# Patient Record
Sex: MALE | Race: WHITE | NOT HISPANIC OR LATINO | Employment: UNEMPLOYED | URBAN - NONMETROPOLITAN AREA
[De-identification: names, ages, dates, MRNs, and addresses within clinical notes are randomized per-mention and may not be internally consistent; named-entity substitution may affect disease eponyms.]

---

## 2019-03-07 ENCOUNTER — APPOINTMENT (OUTPATIENT)
Dept: CT IMAGING | Facility: HOSPITAL | Age: 49
End: 2019-03-07

## 2019-03-07 ENCOUNTER — HOSPITAL ENCOUNTER (EMERGENCY)
Facility: HOSPITAL | Age: 49
Discharge: HOME | End: 2019-03-07
Attending: EMERGENCY MEDICINE | Admitting: EMERGENCY MEDICINE

## 2019-03-07 VITALS
DIASTOLIC BLOOD PRESSURE: 69 MMHG | TEMPERATURE: 98.5 F | BODY MASS INDEX: 33.21 KG/M2 | HEIGHT: 70 IN | OXYGEN SATURATION: 98 % | RESPIRATION RATE: 18 BRPM | SYSTOLIC BLOOD PRESSURE: 151 MMHG | HEART RATE: 64 BPM | WEIGHT: 232 LBS

## 2019-03-07 DIAGNOSIS — M54.30 BACK PAIN WITH SCIATICA: Primary | ICD-10-CM

## 2019-03-07 DIAGNOSIS — M54.9 BACK PAIN WITH SCIATICA: Primary | ICD-10-CM

## 2019-03-07 PROCEDURE — 99283 EMERGENCY DEPT VISIT LOW MDM: CPT

## 2019-03-07 PROCEDURE — 72128 CT CHEST SPINE W/O DYE: CPT

## 2019-03-07 PROCEDURE — 25010000002 METHYLPREDNISOLONE PER 125 MG: Performed by: PHYSICIAN ASSISTANT

## 2019-03-07 PROCEDURE — 72131 CT LUMBAR SPINE W/O DYE: CPT

## 2019-03-07 PROCEDURE — 96372 THER/PROPH/DIAG INJ SC/IM: CPT

## 2019-03-07 PROCEDURE — 72128 CT CHEST SPINE W/O DYE: CPT | Performed by: RADIOLOGY

## 2019-03-07 PROCEDURE — 72131 CT LUMBAR SPINE W/O DYE: CPT | Performed by: RADIOLOGY

## 2019-03-07 PROCEDURE — 25010000002 KETOROLAC TROMETHAMINE PER 15 MG: Performed by: PHYSICIAN ASSISTANT

## 2019-03-07 RX ORDER — ORPHENADRINE CITRATE 100 MG/1
100 TABLET, EXTENDED RELEASE ORAL 2 TIMES DAILY
Qty: 20 TABLET | Refills: 0 | OUTPATIENT
Start: 2019-03-07 | End: 2022-04-29

## 2019-03-07 RX ORDER — METHYLPREDNISOLONE SODIUM SUCCINATE 125 MG/2ML
125 INJECTION, POWDER, LYOPHILIZED, FOR SOLUTION INTRAMUSCULAR; INTRAVENOUS ONCE
Status: COMPLETED | OUTPATIENT
Start: 2019-03-07 | End: 2019-03-07

## 2019-03-07 RX ORDER — KETOROLAC TROMETHAMINE 10 MG/1
10 TABLET, FILM COATED ORAL EVERY 6 HOURS PRN
Qty: 15 TABLET | Refills: 0 | OUTPATIENT
Start: 2019-03-07 | End: 2022-04-29

## 2019-03-07 RX ORDER — PREDNISONE 20 MG/1
20 TABLET ORAL DAILY
Qty: 5 TABLET | Refills: 0 | OUTPATIENT
Start: 2019-03-07 | End: 2022-04-29

## 2019-03-07 RX ORDER — KETOROLAC TROMETHAMINE 30 MG/ML
60 INJECTION, SOLUTION INTRAMUSCULAR; INTRAVENOUS ONCE
Status: COMPLETED | OUTPATIENT
Start: 2019-03-07 | End: 2019-03-07

## 2019-03-07 RX ADMIN — METHYLPREDNISOLONE SODIUM SUCCINATE 125 MG: 125 INJECTION, POWDER, FOR SOLUTION INTRAMUSCULAR; INTRAVENOUS at 12:15

## 2019-03-07 RX ADMIN — KETOROLAC TROMETHAMINE 60 MG: 60 INJECTION, SOLUTION INTRAMUSCULAR at 12:15
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
## 2019-12-06 ENCOUNTER — HOSPITAL ENCOUNTER (EMERGENCY)
Facility: HOSPITAL | Age: 49
Discharge: HOME | End: 2019-12-06
Attending: EMERGENCY MEDICINE | Admitting: EMERGENCY MEDICINE

## 2019-12-06 VITALS
SYSTOLIC BLOOD PRESSURE: 154 MMHG | WEIGHT: 222 LBS | TEMPERATURE: 98.2 F | HEIGHT: 70 IN | RESPIRATION RATE: 18 BRPM | DIASTOLIC BLOOD PRESSURE: 78 MMHG | OXYGEN SATURATION: 99 % | HEART RATE: 67 BPM | BODY MASS INDEX: 31.78 KG/M2

## 2019-12-06 DIAGNOSIS — M54.2 NECK PAIN: Primary | ICD-10-CM

## 2019-12-06 PROCEDURE — 25010000002 PROMETHAZINE PER 50 MG: Performed by: NURSE PRACTITIONER

## 2019-12-06 PROCEDURE — 96372 THER/PROPH/DIAG INJ SC/IM: CPT

## 2019-12-06 PROCEDURE — 99283 EMERGENCY DEPT VISIT LOW MDM: CPT

## 2019-12-06 PROCEDURE — 25010000002 METHYLPREDNISOLONE PER 80 MG: Performed by: NURSE PRACTITIONER

## 2019-12-06 PROCEDURE — 25010000002 HYDROMORPHONE 1 MG/ML SOLUTION: Performed by: NURSE PRACTITIONER

## 2019-12-06 RX ORDER — OMEPRAZOLE 20 MG/1
20 CAPSULE, DELAYED RELEASE ORAL DAILY
COMMUNITY

## 2019-12-06 RX ORDER — HYDROCODONE BITARTRATE AND ACETAMINOPHEN 10; 325 MG/1; MG/1
1 TABLET ORAL 2 TIMES DAILY
COMMUNITY

## 2019-12-06 RX ORDER — METOPROLOL TARTRATE 50 MG/1
25 TABLET, FILM COATED ORAL 2 TIMES DAILY
COMMUNITY
End: 2023-02-27

## 2019-12-06 RX ORDER — PROMETHAZINE HYDROCHLORIDE 25 MG/ML
12.5 INJECTION, SOLUTION INTRAMUSCULAR; INTRAVENOUS ONCE
Status: COMPLETED | OUTPATIENT
Start: 2019-12-06 | End: 2019-12-06

## 2019-12-06 RX ORDER — GABAPENTIN 800 MG/1
800 TABLET ORAL 2 TIMES DAILY
COMMUNITY

## 2019-12-06 RX ORDER — METHYLPREDNISOLONE ACETATE 80 MG/ML
120 INJECTION, SUSPENSION INTRA-ARTICULAR; INTRALESIONAL; INTRAMUSCULAR; SOFT TISSUE ONCE
Status: COMPLETED | OUTPATIENT
Start: 2019-12-06 | End: 2019-12-06

## 2019-12-06 RX ORDER — ALPRAZOLAM 1 MG/1
1 TABLET ORAL 2 TIMES DAILY PRN
COMMUNITY
End: 2022-01-10 | Stop reason: SDUPTHER

## 2019-12-06 RX ADMIN — METHYLPREDNISOLONE ACETATE 120 MG: 80 INJECTION, SUSPENSION INTRA-ARTICULAR; INTRALESIONAL; INTRAMUSCULAR; SOFT TISSUE at 12:39

## 2019-12-06 RX ADMIN — PROMETHAZINE HYDROCHLORIDE 12.5 MG: 25 INJECTION INTRAMUSCULAR; INTRAVENOUS at 12:38

## 2019-12-06 RX ADMIN — HYDROMORPHONE HYDROCHLORIDE 1 MG: 1 INJECTION, SOLUTION INTRAMUSCULAR; INTRAVENOUS; SUBCUTANEOUS at 12:37
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
## 2021-04-21 ENCOUNTER — HOSPITAL ENCOUNTER (OUTPATIENT)
Dept: GENERAL RADIOLOGY | Facility: HOSPITAL | Age: 51
Discharge: HOME | End: 2021-04-21

## 2021-04-21 ENCOUNTER — TRANSCRIBE ORDERS (OUTPATIENT)
Dept: ADMINISTRATIVE | Facility: HOSPITAL | Age: 51
End: 2021-04-21

## 2021-04-21 DIAGNOSIS — M50.03 CERVICAL DISC DISORDER WITH MYELOPATHY OF CERVICOTHORACIC REGION: ICD-10-CM

## 2021-04-21 DIAGNOSIS — R53.1 WEAKNESS: ICD-10-CM

## 2021-04-21 DIAGNOSIS — M54.50 LOW BACK PAIN, UNSPECIFIED BACK PAIN LATERALITY, UNSPECIFIED CHRONICITY, UNSPECIFIED WHETHER SCIATICA PRESENT: ICD-10-CM

## 2021-04-21 DIAGNOSIS — M50.03 CERVICAL DISC DISORDER WITH MYELOPATHY OF CERVICOTHORACIC REGION: Primary | ICD-10-CM

## 2021-04-21 PROCEDURE — 72050 X-RAY EXAM NECK SPINE 4/5VWS: CPT | Performed by: RADIOLOGY

## 2021-04-21 PROCEDURE — 72110 X-RAY EXAM L-2 SPINE 4/>VWS: CPT | Performed by: RADIOLOGY

## 2021-04-21 PROCEDURE — 72110 X-RAY EXAM L-2 SPINE 4/>VWS: CPT

## 2021-04-21 PROCEDURE — 72050 X-RAY EXAM NECK SPINE 4/5VWS: CPT

## 2021-12-08 ENCOUNTER — OFFICE VISIT (OUTPATIENT)
Dept: PSYCHIATRY | Facility: CLINIC | Age: 51
End: 2021-12-08

## 2021-12-08 VITALS
WEIGHT: 265 LBS | SYSTOLIC BLOOD PRESSURE: 126 MMHG | BODY MASS INDEX: 37.94 KG/M2 | HEART RATE: 68 BPM | HEIGHT: 70 IN | OXYGEN SATURATION: 96 % | DIASTOLIC BLOOD PRESSURE: 86 MMHG

## 2021-12-08 DIAGNOSIS — Z79.899 MEDICATION MANAGEMENT: ICD-10-CM

## 2021-12-08 DIAGNOSIS — F41.1 GENERALIZED ANXIETY DISORDER: ICD-10-CM

## 2021-12-08 DIAGNOSIS — F33.1 MODERATE EPISODE OF RECURRENT MAJOR DEPRESSIVE DISORDER (HCC): ICD-10-CM

## 2021-12-08 DIAGNOSIS — F31.32 BIPOLAR AFFECTIVE DISORDER, CURRENTLY DEPRESSED, MODERATE (HCC): Primary | ICD-10-CM

## 2021-12-08 DIAGNOSIS — G47.00 INSOMNIA, UNSPECIFIED TYPE: ICD-10-CM

## 2021-12-08 LAB
AMPHETAMINE CUT-OFF: NORMAL
BENZODIAZIPINE CUT-OFF: NORMAL
BUPRENORPHINE CUT-OFF: NORMAL
COCAINE CUT-OFF: NORMAL
EXTERNAL AMPHETAMINE SCREEN URINE: NEGATIVE
EXTERNAL BENZODIAZEPINE SCREEN URINE: NEGATIVE
EXTERNAL BUPRENORPHINE SCREEN URINE: NEGATIVE
EXTERNAL COCAINE SCREEN URINE: NEGATIVE
EXTERNAL MDMA: NEGATIVE
EXTERNAL METHADONE SCREEN URINE: NEGATIVE
EXTERNAL METHAMPHETAMINE SCREEN URINE: NEGATIVE
EXTERNAL OPIATES SCREEN URINE: NEGATIVE
EXTERNAL OXYCODONE SCREEN URINE: NEGATIVE
EXTERNAL THC SCREEN URINE: NEGATIVE
MDMA CUT-OFF: NORMAL
METHADONE CUT-OFF: NORMAL
METHAMPHETAMINE CUT-OFF: NORMAL
OPIATES CUT-OFF: NORMAL
OXYCODONE CUT-OFF: NORMAL
THC CUT-OFF: NORMAL

## 2021-12-08 PROCEDURE — 90792 PSYCH DIAG EVAL W/MED SRVCS: CPT | Performed by: NURSE PRACTITIONER

## 2021-12-08 RX ORDER — ESCITALOPRAM OXALATE 20 MG/1
20 TABLET ORAL DAILY
Qty: 30 TABLET | Refills: 0 | Status: SHIPPED | OUTPATIENT
Start: 2021-12-08 | End: 2022-01-10 | Stop reason: SDUPTHER

## 2021-12-08 RX ORDER — LOSARTAN POTASSIUM AND HYDROCHLOROTHIAZIDE 12.5; 5 MG/1; MG/1
TABLET ORAL
COMMUNITY
Start: 2021-11-17

## 2021-12-08 RX ORDER — HYDROXYZINE PAMOATE 25 MG/1
CAPSULE ORAL
Qty: 90 CAPSULE | Refills: 0 | Status: SHIPPED | OUTPATIENT
Start: 2021-12-08 | End: 2022-01-10 | Stop reason: SDUPTHER

## 2021-12-08 RX ORDER — ERGOCALCIFEROL 1.25 MG/1
CAPSULE ORAL
COMMUNITY
Start: 2021-10-07

## 2021-12-08 RX ORDER — GEMFIBROZIL 600 MG/1
TABLET, FILM COATED ORAL
COMMUNITY
Start: 2021-12-02

## 2021-12-08 RX ORDER — HYDROXYZINE PAMOATE 25 MG/1
CAPSULE ORAL
COMMUNITY
Start: 2021-11-17 | End: 2021-12-08 | Stop reason: SDUPTHER

## 2021-12-08 RX ORDER — TIZANIDINE 4 MG/1
TABLET ORAL
COMMUNITY
Start: 2021-12-02 | End: 2022-04-29

## 2021-12-08 RX ORDER — FLECAINIDE ACETATE 50 MG/1
TABLET ORAL
COMMUNITY
Start: 2021-12-02

## 2021-12-08 RX ORDER — IBUPROFEN 800 MG/1
TABLET ORAL
COMMUNITY
Start: 2021-12-02 | End: 2022-04-29

## 2021-12-08 RX ORDER — LAMOTRIGINE 25 MG/1
25 TABLET ORAL 2 TIMES DAILY
Qty: 60 TABLET | Refills: 0 | Status: SHIPPED | OUTPATIENT
Start: 2021-12-08 | End: 2022-01-10 | Stop reason: SDUPTHER

## 2022-01-10 ENCOUNTER — LAB (OUTPATIENT)
Dept: LAB | Facility: HOSPITAL | Age: 52
End: 2022-01-10

## 2022-01-10 ENCOUNTER — OFFICE VISIT (OUTPATIENT)
Dept: PSYCHIATRY | Facility: CLINIC | Age: 52
End: 2022-01-10

## 2022-01-10 VITALS
WEIGHT: 285 LBS | HEIGHT: 70 IN | DIASTOLIC BLOOD PRESSURE: 71 MMHG | SYSTOLIC BLOOD PRESSURE: 118 MMHG | BODY MASS INDEX: 40.8 KG/M2 | HEART RATE: 51 BPM

## 2022-01-10 DIAGNOSIS — F33.1 MODERATE EPISODE OF RECURRENT MAJOR DEPRESSIVE DISORDER: ICD-10-CM

## 2022-01-10 DIAGNOSIS — G47.00 INSOMNIA, UNSPECIFIED TYPE: ICD-10-CM

## 2022-01-10 DIAGNOSIS — F31.32 BIPOLAR AFFECTIVE DISORDER, CURRENTLY DEPRESSED, MODERATE: Primary | ICD-10-CM

## 2022-01-10 DIAGNOSIS — F41.1 GENERALIZED ANXIETY DISORDER: ICD-10-CM

## 2022-01-10 DIAGNOSIS — Z79.899 MEDICATION MANAGEMENT: ICD-10-CM

## 2022-01-10 DIAGNOSIS — Z79.899 MEDICATION MANAGEMENT: Primary | ICD-10-CM

## 2022-01-10 LAB
ALBUMIN SERPL-MCNC: 4.25 G/DL (ref 3.5–5.2)
ALBUMIN/GLOB SERPL: 1.9 G/DL
ALP SERPL-CCNC: 74 U/L (ref 39–117)
ALT SERPL W P-5'-P-CCNC: 8 U/L (ref 1–41)
AMPHETAMINE CUT-OFF: ABNORMAL
ANION GAP SERPL CALCULATED.3IONS-SCNC: 9.1 MMOL/L (ref 5–15)
AST SERPL-CCNC: 14 U/L (ref 1–40)
BASOPHILS # BLD AUTO: 0.03 10*3/MM3 (ref 0–0.2)
BASOPHILS NFR BLD AUTO: 0.8 % (ref 0–1.5)
BENZODIAZIPINE CUT-OFF: ABNORMAL
BILIRUB SERPL-MCNC: 0.3 MG/DL (ref 0–1.2)
BUN SERPL-MCNC: 26 MG/DL (ref 6–20)
BUN/CREAT SERPL: 20.8 (ref 7–25)
BUPRENORPHINE CUT-OFF: ABNORMAL
CALCIUM SPEC-SCNC: 10.1 MG/DL (ref 8.6–10.5)
CHLORIDE SERPL-SCNC: 99 MMOL/L (ref 98–107)
CHOLEST SERPL-MCNC: 181 MG/DL (ref 0–200)
CO2 SERPL-SCNC: 30.9 MMOL/L (ref 22–29)
COCAINE CUT-OFF: ABNORMAL
CREAT SERPL-MCNC: 1.25 MG/DL (ref 0.76–1.27)
DEPRECATED RDW RBC AUTO: 36.2 FL (ref 37–54)
EOSINOPHIL # BLD AUTO: 0.12 10*3/MM3 (ref 0–0.4)
EOSINOPHIL NFR BLD AUTO: 3.2 % (ref 0.3–6.2)
ERYTHROCYTE [DISTWIDTH] IN BLOOD BY AUTOMATED COUNT: 12 % (ref 12.3–15.4)
EXTERNAL AMPHETAMINE SCREEN URINE: NEGATIVE
EXTERNAL BENZODIAZEPINE SCREEN URINE: POSITIVE
EXTERNAL BUPRENORPHINE SCREEN URINE: NEGATIVE
EXTERNAL COCAINE SCREEN URINE: NEGATIVE
EXTERNAL MDMA: NEGATIVE
EXTERNAL METHADONE SCREEN URINE: NEGATIVE
EXTERNAL METHAMPHETAMINE SCREEN URINE: NEGATIVE
EXTERNAL OPIATES SCREEN URINE: POSITIVE
EXTERNAL OXYCODONE SCREEN URINE: NEGATIVE
EXTERNAL THC SCREEN URINE: POSITIVE
GFR SERPL CREATININE-BSD FRML MDRD: 61 ML/MIN/1.73
GLOBULIN UR ELPH-MCNC: 2.3 GM/DL
GLUCOSE SERPL-MCNC: 139 MG/DL (ref 65–99)
HBA1C MFR BLD: 6.1 % (ref 4.8–5.6)
HCT VFR BLD AUTO: 37.1 % (ref 37.5–51)
HDLC SERPL-MCNC: 26 MG/DL (ref 40–60)
HGB BLD-MCNC: 12.3 G/DL (ref 13–17.7)
IMM GRANULOCYTES # BLD AUTO: 0.01 10*3/MM3 (ref 0–0.05)
IMM GRANULOCYTES NFR BLD AUTO: 0.3 % (ref 0–0.5)
LDLC SERPL CALC-MCNC: 60 MG/DL (ref 0–100)
LDLC/HDLC SERPL: 1.11 {RATIO}
LYMPHOCYTES # BLD AUTO: 1.58 10*3/MM3 (ref 0.7–3.1)
LYMPHOCYTES NFR BLD AUTO: 42.7 % (ref 19.6–45.3)
MCH RBC QN AUTO: 27.6 PG (ref 26.6–33)
MCHC RBC AUTO-ENTMCNC: 33.2 G/DL (ref 31.5–35.7)
MCV RBC AUTO: 83.4 FL (ref 79–97)
MDMA CUT-OFF: ABNORMAL
METHADONE CUT-OFF: ABNORMAL
METHAMPHETAMINE CUT-OFF: ABNORMAL
MONOCYTES # BLD AUTO: 0.33 10*3/MM3 (ref 0.1–0.9)
MONOCYTES NFR BLD AUTO: 8.9 % (ref 5–12)
NEUTROPHILS NFR BLD AUTO: 1.63 10*3/MM3 (ref 1.7–7)
NEUTROPHILS NFR BLD AUTO: 44.1 % (ref 42.7–76)
NRBC BLD AUTO-RTO: 0 /100 WBC (ref 0–0.2)
OPIATES CUT-OFF: ABNORMAL
OXYCODONE CUT-OFF: ABNORMAL
PLATELET # BLD AUTO: 176 10*3/MM3 (ref 140–450)
PMV BLD AUTO: 10.2 FL (ref 6–12)
POTASSIUM SERPL-SCNC: 4.1 MMOL/L (ref 3.5–5.2)
PROT SERPL-MCNC: 6.5 G/DL (ref 6–8.5)
RBC # BLD AUTO: 4.45 10*6/MM3 (ref 4.14–5.8)
SODIUM SERPL-SCNC: 139 MMOL/L (ref 136–145)
T4 FREE SERPL-MCNC: 0.71 NG/DL (ref 0.93–1.7)
THC CUT-OFF: ABNORMAL
TRIGL SERPL-MCNC: 631 MG/DL (ref 0–150)
TSH SERPL DL<=0.05 MIU/L-ACNC: 5.7 UIU/ML (ref 0.27–4.2)
VLDLC SERPL-MCNC: 95 MG/DL (ref 5–40)
WBC NRBC COR # BLD: 3.7 10*3/MM3 (ref 3.4–10.8)

## 2022-01-10 PROCEDURE — 85025 COMPLETE CBC W/AUTO DIFF WBC: CPT | Performed by: NURSE PRACTITIONER

## 2022-01-10 PROCEDURE — 84443 ASSAY THYROID STIM HORMONE: CPT | Performed by: NURSE PRACTITIONER

## 2022-01-10 PROCEDURE — 99214 OFFICE O/P EST MOD 30 MIN: CPT | Performed by: NURSE PRACTITIONER

## 2022-01-10 PROCEDURE — 84439 ASSAY OF FREE THYROXINE: CPT | Performed by: NURSE PRACTITIONER

## 2022-01-10 PROCEDURE — 80061 LIPID PANEL: CPT | Performed by: NURSE PRACTITIONER

## 2022-01-10 PROCEDURE — 83036 HEMOGLOBIN GLYCOSYLATED A1C: CPT | Performed by: NURSE PRACTITIONER

## 2022-01-10 PROCEDURE — 80053 COMPREHEN METABOLIC PANEL: CPT | Performed by: NURSE PRACTITIONER

## 2022-01-10 RX ORDER — HYDROXYZINE PAMOATE 25 MG/1
CAPSULE ORAL
Qty: 90 CAPSULE | Refills: 0 | Status: SHIPPED | OUTPATIENT
Start: 2022-01-10 | End: 2022-02-07 | Stop reason: SDUPTHER

## 2022-01-10 RX ORDER — ALPRAZOLAM 0.5 MG/1
0.5 TABLET ORAL 3 TIMES DAILY PRN
Qty: 90 TABLET | Refills: 0 | Status: SHIPPED | OUTPATIENT
Start: 2022-01-10 | End: 2022-02-07 | Stop reason: SDUPTHER

## 2022-01-10 RX ORDER — LAMOTRIGINE 25 MG/1
50 TABLET ORAL 2 TIMES DAILY
Qty: 120 TABLET | Refills: 0 | Status: SHIPPED | OUTPATIENT
Start: 2022-01-10 | End: 2022-02-07 | Stop reason: SDUPTHER

## 2022-01-10 RX ORDER — ESCITALOPRAM OXALATE 20 MG/1
20 TABLET ORAL DAILY
Qty: 30 TABLET | Refills: 0 | Status: SHIPPED | OUTPATIENT
Start: 2022-01-10 | End: 2022-02-07 | Stop reason: SDUPTHER

## 2022-02-07 ENCOUNTER — OFFICE VISIT (OUTPATIENT)
Dept: PSYCHIATRY | Facility: CLINIC | Age: 52
End: 2022-02-07

## 2022-02-07 VITALS
BODY MASS INDEX: 39.22 KG/M2 | WEIGHT: 274 LBS | HEIGHT: 70 IN | DIASTOLIC BLOOD PRESSURE: 84 MMHG | SYSTOLIC BLOOD PRESSURE: 128 MMHG

## 2022-02-07 DIAGNOSIS — F41.1 GENERALIZED ANXIETY DISORDER: ICD-10-CM

## 2022-02-07 DIAGNOSIS — F33.1 MODERATE EPISODE OF RECURRENT MAJOR DEPRESSIVE DISORDER: ICD-10-CM

## 2022-02-07 DIAGNOSIS — F31.32 BIPOLAR AFFECTIVE DISORDER, CURRENTLY DEPRESSED, MODERATE: Primary | ICD-10-CM

## 2022-02-07 DIAGNOSIS — G47.00 INSOMNIA, UNSPECIFIED TYPE: ICD-10-CM

## 2022-02-07 DIAGNOSIS — Z79.899 MEDICATION MANAGEMENT: ICD-10-CM

## 2022-02-07 PROCEDURE — 99214 OFFICE O/P EST MOD 30 MIN: CPT | Performed by: NURSE PRACTITIONER

## 2022-02-07 RX ORDER — AMLODIPINE BESYLATE 10 MG/1
TABLET ORAL
COMMUNITY
Start: 2022-01-25

## 2022-02-07 RX ORDER — LAMOTRIGINE 25 MG/1
50 TABLET ORAL 2 TIMES DAILY
Qty: 120 TABLET | Refills: 0 | Status: SHIPPED | OUTPATIENT
Start: 2022-02-07 | End: 2022-03-07 | Stop reason: SDUPTHER

## 2022-02-07 RX ORDER — ESCITALOPRAM OXALATE 20 MG/1
20 TABLET ORAL DAILY
Qty: 30 TABLET | Refills: 0 | Status: SHIPPED | OUTPATIENT
Start: 2022-02-07 | End: 2022-03-07 | Stop reason: SDUPTHER

## 2022-02-07 RX ORDER — HYDROXYZINE PAMOATE 25 MG/1
CAPSULE ORAL
Qty: 90 CAPSULE | Refills: 0 | Status: SHIPPED | OUTPATIENT
Start: 2022-02-07 | End: 2022-03-07 | Stop reason: SDUPTHER

## 2022-02-07 RX ORDER — ALPRAZOLAM 0.5 MG/1
0.5 TABLET ORAL 3 TIMES DAILY PRN
Qty: 90 TABLET | Refills: 0 | Status: SHIPPED | OUTPATIENT
Start: 2022-02-07 | End: 2022-03-07 | Stop reason: SDUPTHER

## 2022-02-25 ENCOUNTER — TRANSCRIBE ORDERS (OUTPATIENT)
Dept: ADMINISTRATIVE | Facility: HOSPITAL | Age: 52
End: 2022-02-25

## 2022-02-25 DIAGNOSIS — Z13.6 SCREENING FOR ISCHEMIC HEART DISEASE: Primary | ICD-10-CM

## 2022-03-07 ENCOUNTER — OFFICE VISIT (OUTPATIENT)
Dept: PSYCHIATRY | Facility: CLINIC | Age: 52
End: 2022-03-07

## 2022-03-07 VITALS
HEART RATE: 72 BPM | BODY MASS INDEX: 39.22 KG/M2 | DIASTOLIC BLOOD PRESSURE: 84 MMHG | HEIGHT: 70 IN | WEIGHT: 274 LBS | SYSTOLIC BLOOD PRESSURE: 128 MMHG

## 2022-03-07 DIAGNOSIS — Z79.899 MEDICATION MANAGEMENT: ICD-10-CM

## 2022-03-07 DIAGNOSIS — G47.00 INSOMNIA, UNSPECIFIED TYPE: ICD-10-CM

## 2022-03-07 DIAGNOSIS — F41.1 GENERALIZED ANXIETY DISORDER: ICD-10-CM

## 2022-03-07 DIAGNOSIS — F33.1 MODERATE EPISODE OF RECURRENT MAJOR DEPRESSIVE DISORDER: ICD-10-CM

## 2022-03-07 DIAGNOSIS — F31.32 BIPOLAR AFFECTIVE DISORDER, CURRENTLY DEPRESSED, MODERATE: Primary | ICD-10-CM

## 2022-03-07 PROCEDURE — 99214 OFFICE O/P EST MOD 30 MIN: CPT | Performed by: NURSE PRACTITIONER

## 2022-03-07 RX ORDER — ESCITALOPRAM OXALATE 20 MG/1
20 TABLET ORAL DAILY
Qty: 30 TABLET | Refills: 2 | Status: SHIPPED | OUTPATIENT
Start: 2022-03-07 | End: 2022-06-08 | Stop reason: SDUPTHER

## 2022-03-07 RX ORDER — ALPRAZOLAM 0.5 MG/1
0.5 TABLET ORAL 3 TIMES DAILY PRN
Qty: 90 TABLET | Refills: 0 | Status: SHIPPED | OUTPATIENT
Start: 2022-03-07 | End: 2022-04-05 | Stop reason: SDUPTHER

## 2022-03-07 RX ORDER — LAMOTRIGINE 25 MG/1
50 TABLET ORAL 2 TIMES DAILY
Qty: 120 TABLET | Refills: 2 | Status: SHIPPED | OUTPATIENT
Start: 2022-03-07 | End: 2022-06-08 | Stop reason: SDUPTHER

## 2022-03-07 RX ORDER — HYDROXYZINE PAMOATE 25 MG/1
CAPSULE ORAL
Qty: 90 CAPSULE | Refills: 2 | Status: SHIPPED | OUTPATIENT
Start: 2022-03-07 | End: 2022-06-08 | Stop reason: SDUPTHER

## 2022-03-08 ENCOUNTER — APPOINTMENT (OUTPATIENT)
Dept: BONE DENSITY | Facility: HOSPITAL | Age: 52
End: 2022-03-08

## 2022-03-08 ENCOUNTER — APPOINTMENT (OUTPATIENT)
Dept: ULTRASOUND IMAGING | Facility: HOSPITAL | Age: 52
End: 2022-03-08

## 2022-03-14 ENCOUNTER — OFFICE VISIT (OUTPATIENT)
Dept: PSYCHIATRY | Facility: CLINIC | Age: 52
End: 2022-03-14

## 2022-03-14 DIAGNOSIS — F41.1 GENERALIZED ANXIETY DISORDER: ICD-10-CM

## 2022-03-14 DIAGNOSIS — F31.32 BIPOLAR AFFECTIVE DISORDER, CURRENTLY DEPRESSED, MODERATE: Primary | ICD-10-CM

## 2022-03-14 DIAGNOSIS — F33.1 MODERATE EPISODE OF RECURRENT MAJOR DEPRESSIVE DISORDER: ICD-10-CM

## 2022-03-14 PROCEDURE — 90791 PSYCH DIAGNOSTIC EVALUATION: CPT | Performed by: SOCIAL WORKER
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
## 2022-03-23 ENCOUNTER — HOSPITAL ENCOUNTER (OUTPATIENT)
Dept: BONE DENSITY | Facility: HOSPITAL | Age: 52
Discharge: HOME | End: 2022-03-23

## 2022-03-23 ENCOUNTER — HOSPITAL ENCOUNTER (OUTPATIENT)
Dept: ULTRASOUND IMAGING | Facility: HOSPITAL | Age: 52
Discharge: HOME | End: 2022-03-23

## 2022-03-23 DIAGNOSIS — Z13.6 SCREENING FOR ISCHEMIC HEART DISEASE: ICD-10-CM

## 2022-03-23 DIAGNOSIS — M81.0 AGE-RELATED OSTEOPOROSIS WITHOUT CURRENT PATHOLOGICAL FRACTURE: ICD-10-CM

## 2022-03-23 PROCEDURE — 77080 DXA BONE DENSITY AXIAL: CPT

## 2022-03-23 PROCEDURE — 76706 US ABDL AORTA SCREEN AAA: CPT | Performed by: RADIOLOGY

## 2022-03-23 PROCEDURE — 76706 US ABDL AORTA SCREEN AAA: CPT

## 2022-03-23 PROCEDURE — 77080 DXA BONE DENSITY AXIAL: CPT | Performed by: RADIOLOGY

## 2022-04-05 DIAGNOSIS — F41.1 GENERALIZED ANXIETY DISORDER: ICD-10-CM

## 2022-04-06 RX ORDER — ALPRAZOLAM 0.5 MG/1
0.5 TABLET ORAL 3 TIMES DAILY PRN
Qty: 90 TABLET | Refills: 0 | Status: SHIPPED | OUTPATIENT
Start: 2022-04-06 | End: 2022-05-03 | Stop reason: SDUPTHER

## 2022-04-14 ENCOUNTER — OFFICE VISIT (OUTPATIENT)
Dept: PSYCHIATRY | Facility: CLINIC | Age: 52
End: 2022-04-14

## 2022-04-14 DIAGNOSIS — F31.32 BIPOLAR AFFECTIVE DISORDER, CURRENTLY DEPRESSED, MODERATE: ICD-10-CM

## 2022-04-14 DIAGNOSIS — F41.1 GENERALIZED ANXIETY DISORDER: Primary | ICD-10-CM

## 2022-04-14 PROCEDURE — 90837 PSYTX W PT 60 MINUTES: CPT | Performed by: SOCIAL WORKER

## 2022-04-14 NOTE — PROGRESS NOTES
Date of Service: April 14, 2022  Time In: 2:00 pm  Time Out: 2:55 pm    PROGRESS NOTE  Data:The patient is a 51 y.o. person who met 1:1 with Raquel Lacey [facility]CSW[facility]FFRE[facility] for regularly scheduled individual session.Verified the patients identity.  Will presents for session on time, clean and casually dressed  without evidence of intoxication, withdrawal, or perceptual disturbance.   The patient was open and engaged.      Chief Compliant: Patient presents with   Interactive Complexity: Interactive Complexity No    HPI: Data:  Patient discloses that he is very anxious after witnessing toe people arguing and swearing in the office.  He vents about this and slowly de-escalates. Patient shares that he is taking all of his medication as directed.   He continues to have significant pain which impacts his sleep.  He has plans to try and mow the grass over a week to keep busy and be outside. Has noticed elsa low energy and is trying to force hi,self to be active.  Stopped exercising but is hopeful this will get better.  He shares that exercise helps him sleep better.  He denies any cheri, any grandiosity.  Has been drinking more water and has tried to cut back on portions.  Has noticed that when he loses weight his back doesn't hurt as much.  Generalized Anxiety  Excess Worry, Restless/Edgy, Easily fatigued and Decreased concentration. Onset of symptoms was vague.  Symptoms are associated with chronic pain/pain management and lack of support.  Symptoms are aggravated by anxiety, stress and weight management.   Symptoms improve with medication management, therapy, self-management and personal self-care (wellness) Current rates severity of symptoms, on a scale of 1-10 (10 is the most severe) 7 Context Family and social history was reviewed and is unchanged  Quality been intermittent without a consistent pattern.    CLINICAL MANEUVERING/INTERVENTION/SUPPORTIVE PSYCHOTHERAPY: Therapist continued to promote the therapeutic  alliance, address the patient’s issues, and strengthen self awareness, insights, and coping skills.  Discussed physical health and mental health including role of exercise.  Praised the patient for new insights while assisting him to identify and challenge the thoughts that lead to unhealthy habits and increased emotional distress. Therapist applied CBT/REBT, Exploration of Coping Skills and Interactive Feedback and encouraged the patient to use positive coping skills such as Exercising, Reframe the way you are thinking about the problem, Self Care (Take care of your body in a way that makes you feel good - paint your nails, do your hair, put on a face mask), Use positive self-talk, Keep a positive attitude and Utilize resources/coping skills.  Therapist allowed Will  to freely discuss issues without interruption or judgment. Provided safe, confidential environment to facilitate the development of positive therapeutic relationship and encourage open, honest communication. Assisted patient in identifying increased risk factors which would indicate the need for higher level of care including thoughts to harm self or others, self-harming behavior, and/or binge drinking and encouraged patient to contact this office, call 911, or present to the nearest emergency room should any of these events occur. Discussed crisis intervention services and means to access.     Risk Assessment:  [x] No SI/HI, []  passive thoughts []  suicidal ideation , []  homicidal ideation, [] self-harm Explain     Assessment      Psychiatric/Behavioral: Negative for agitation, behavioral problems, decreased concentration, dysphoric mood, hallucinations, self-injury, sleep disturbance, suicidal ideas, negative for hyperactivity. The patient is nervous/anxious.          Mental Status Exam:    Hygiene:   good  Cooperation:  Cooperative  Eye Contact:  Good  Psychomotor Behavior:  Appropriate  Affect:  Full range and Appropriate  Hopelessness:  Denies  Speech:  Normal  Goal directed and Linear  Thought Content:  Normal  Suicidal:  None  Homicidal:  None  Hallucinations:  None  Delusion:  None  Memory:  Intact  Orientation:  Person, Place, Time and Situation  Reliability:  good  Insight:  Fair  Judgement:  Fair  Impulse Control:  Fair    Patient's Support Network Includes:  extended family    Progress toward goal: Not at goal    Functional Status: Moderate impairment     Overall: Anxious     VISIT DIAGNOSIS:     ICD-10-CM ICD-9-CM   1. Generalized anxiety disorder  F41.1 300.02   2. Bipolar affective disorder, currently depressed, moderate ([facility])  F31.32 296.52        PROGNOSIS: fair if patient follows treatment recommendations    The patient appears to be mentally/physically stable compared to his baseline functioning.  However, they continues to present with a severe chronic mental illness. As a result,  they would be at significantly increased risk for decompensation and possibly higher level of care without continued treatment.  It is reasonable to assume they would considerably benefit from ongoing treatment.          PROGRESS TOWARD CURRENT PLAN OF CARE/TREATMENT PLAN:  Making Progress    SHORT-TERM GOALS: The patient will  will learn and practice at least 2 anxiety management techniques with goal of decreasing anxiety, will learn and practice at least 2 mood swing management techniques with goal of decreasing mood swings, will engage in one self-care activity daily, per self-report and will engage in one enjoyable activity daily, per self-report     LONG-TERM GOALS: With the help of therapy, I would like to:   get more active in sports and other physical activities, learn how to structure my spare-time more meaningfully (hobbies, etc) and learn how to enjoy life and have fun  learn how to be more assertive with others and set appropriate boundaries and learn how to handle other people's reactions to my behavior (criticism, rejection, praise,  "etc.).  understand more clearly who I am, what I' m capable of, and what I want out of life  clarify my needs and desires and learn how to express them more effectively, learn how to pursue my goals and plans more effectively, allow myself to experience feelings and express them more effectively and learn how to deal with strong negative feelings (e.g., anger, rage)  learn how to cope with negative thoughts, ruminations, or sense of guilt, learn how to cope with moodiness or mood fluctuations, learn how to reduce or cope with physical pain, learn how to cope with  physical illness, learn how be more organized in daily life and learn how to handle stressful situations better    STRENGTHS: Literate and Articulate    WEAKNESSES: Poor social support and Poor coping skills    Plan   Crisis Plan:  Symptoms and/or behaviors to indicate a crisis: Extreme mood changes; including uncontrollable \"highs\" or euphoria and Thinking about suicide    What calming techniques or other strategies will patient use to de-esclate and stay safe: slow down, breathe, visualize calming self, think it though, listen to music, change focus, take a walk  Who is one person patient can contact to assist with de-escalation? children    Crisis Management: the Patient will contact staff or crisis line if symptoms exacerbate or if harm to self or others becomes a concern. Crisis resources include: Crisis Line [phone], 911, Local Law Enforcement, [facility], [facility] 24/7 Emergency Room [phone].    PLAN:   Will continue in MONTHLY, QUARTERLY or AS NEEDED ongoing outpatient treatment via Face-to-Facewith primary therapist and pharmacotherapy as scheduled.   Will  report any adverse reactions to treatment/medication interventions immediately.  Will be compliant with treatment and appointments.   Patient will begin to increase water consumption to 64 ounces a day with PCP approval  Patient will begin walking/moving for 5 minutes each day and " slowly build up his activity level  Patient will measure his food servings and check the recommended food serving size to increase awareness  Patient to limit/stop drinking sweet tea.    April 14, 2022 14:11 EDT    Recommended Referrals: Psychiatrist/APRN and Medical Provider (PCP)  Patient will adhere to medication regimen as prescribed and report any side effects. Patient will contact this office, call 911 or present to the nearest emergency room should suicidal or homicidal ideations occur. Provide Cognitive Behavioral Therapy and Solution Focused Therapy to improve functioning, maintain stability, and avoid decompensation and the need for higher level of care.          Future Appointments       Provider Department Center    5/16/2022 2:00 PM Raquel May LCSW [facility]    6/8/2022 10:30 AM Shante Porter APRN [facility]          April 14, 2022 14:11 EDT      Raquel Lacey LCSW, TIM[facility]

## 2022-04-29 ENCOUNTER — APPOINTMENT (OUTPATIENT)
Dept: CT IMAGING | Facility: HOSPITAL | Age: 52
End: 2022-04-29

## 2022-04-29 ENCOUNTER — HOSPITAL ENCOUNTER (EMERGENCY)
Facility: HOSPITAL | Age: 52
Discharge: HOME | End: 2022-04-29
Attending: EMERGENCY MEDICINE | Admitting: EMERGENCY MEDICINE

## 2022-04-29 VITALS
HEIGHT: 70 IN | BODY MASS INDEX: 37.8 KG/M2 | SYSTOLIC BLOOD PRESSURE: 130 MMHG | OXYGEN SATURATION: 99 % | HEART RATE: 77 BPM | TEMPERATURE: 97.9 F | DIASTOLIC BLOOD PRESSURE: 86 MMHG | RESPIRATION RATE: 18 BRPM | WEIGHT: 264 LBS

## 2022-04-29 DIAGNOSIS — M54.50 ACUTE BILATERAL LOW BACK PAIN WITHOUT SCIATICA: ICD-10-CM

## 2022-04-29 DIAGNOSIS — M54.2 CERVICALGIA: Primary | ICD-10-CM

## 2022-04-29 PROCEDURE — 96372 THER/PROPH/DIAG INJ SC/IM: CPT

## 2022-04-29 PROCEDURE — 72128 CT CHEST SPINE W/O DYE: CPT

## 2022-04-29 PROCEDURE — 25010000002 KETOROLAC TROMETHAMINE PER 15 MG: Performed by: PHYSICIAN ASSISTANT

## 2022-04-29 PROCEDURE — 72128 CT CHEST SPINE W/O DYE: CPT | Performed by: RADIOLOGY

## 2022-04-29 PROCEDURE — 99282 EMERGENCY DEPT VISIT SF MDM: CPT

## 2022-04-29 PROCEDURE — 72125 CT NECK SPINE W/O DYE: CPT | Performed by: RADIOLOGY

## 2022-04-29 PROCEDURE — 25010000002 METHYLPREDNISOLONE PER 125 MG: Performed by: PHYSICIAN ASSISTANT

## 2022-04-29 PROCEDURE — 72125 CT NECK SPINE W/O DYE: CPT

## 2022-04-29 PROCEDURE — 72131 CT LUMBAR SPINE W/O DYE: CPT | Performed by: RADIOLOGY

## 2022-04-29 PROCEDURE — 72131 CT LUMBAR SPINE W/O DYE: CPT

## 2022-04-29 PROCEDURE — 25010000002 ORPHENADRINE CITRATE PER 60 MG: Performed by: PHYSICIAN ASSISTANT

## 2022-04-29 RX ORDER — ORPHENADRINE CITRATE 30 MG/ML
60 INJECTION INTRAMUSCULAR; INTRAVENOUS ONCE
Status: COMPLETED | OUTPATIENT
Start: 2022-04-29 | End: 2022-04-29

## 2022-04-29 RX ORDER — KETOROLAC TROMETHAMINE 10 MG/1
10 TABLET, FILM COATED ORAL EVERY 6 HOURS PRN
Qty: 12 TABLET | Refills: 0 | Status: SHIPPED | OUTPATIENT
Start: 2022-04-29 | End: 2023-02-27

## 2022-04-29 RX ORDER — KETOROLAC TROMETHAMINE 30 MG/ML
30 INJECTION, SOLUTION INTRAMUSCULAR; INTRAVENOUS ONCE
Status: COMPLETED | OUTPATIENT
Start: 2022-04-29 | End: 2022-04-29

## 2022-04-29 RX ORDER — ORPHENADRINE CITRATE 100 MG/1
100 TABLET, EXTENDED RELEASE ORAL 2 TIMES DAILY
Qty: 20 TABLET | Refills: 0 | Status: SHIPPED | OUTPATIENT
Start: 2022-04-29 | End: 2023-02-27

## 2022-04-29 RX ORDER — METHYLPREDNISOLONE SODIUM SUCCINATE 125 MG/2ML
80 INJECTION, POWDER, LYOPHILIZED, FOR SOLUTION INTRAMUSCULAR; INTRAVENOUS ONCE
Status: COMPLETED | OUTPATIENT
Start: 2022-04-29 | End: 2022-04-29

## 2022-04-29 RX ADMIN — KETOROLAC TROMETHAMINE 30 MG: 30 INJECTION, SOLUTION INTRAMUSCULAR; INTRAVENOUS at 12:51

## 2022-04-29 RX ADMIN — ORPHENADRINE CITRATE 60 MG: 30 INJECTION INTRAMUSCULAR; INTRAVENOUS at 12:51

## 2022-04-29 RX ADMIN — METHYLPREDNISOLONE SODIUM SUCCINATE 80 MG: 125 INJECTION, POWDER, FOR SOLUTION INTRAMUSCULAR; INTRAVENOUS at 12:50

## 2022-05-03 DIAGNOSIS — F41.1 GENERALIZED ANXIETY DISORDER: ICD-10-CM

## 2022-05-04 RX ORDER — ALPRAZOLAM 0.5 MG/1
0.5 TABLET ORAL 3 TIMES DAILY PRN
Qty: 90 TABLET | Refills: 0 | Status: SHIPPED | OUTPATIENT
Start: 2022-05-04 | End: 2022-06-08 | Stop reason: SDUPTHER

## 2022-06-08 ENCOUNTER — OFFICE VISIT (OUTPATIENT)
Dept: PSYCHIATRY | Facility: CLINIC | Age: 52
End: 2022-06-08

## 2022-06-08 VITALS
WEIGHT: 270.8 LBS | DIASTOLIC BLOOD PRESSURE: 80 MMHG | HEART RATE: 71 BPM | SYSTOLIC BLOOD PRESSURE: 128 MMHG | HEIGHT: 70 IN | BODY MASS INDEX: 38.77 KG/M2

## 2022-06-08 DIAGNOSIS — G47.00 INSOMNIA, UNSPECIFIED TYPE: ICD-10-CM

## 2022-06-08 DIAGNOSIS — Z79.899 MEDICATION MANAGEMENT: ICD-10-CM

## 2022-06-08 DIAGNOSIS — F33.1 MODERATE EPISODE OF RECURRENT MAJOR DEPRESSIVE DISORDER: ICD-10-CM

## 2022-06-08 DIAGNOSIS — F41.1 GENERALIZED ANXIETY DISORDER: Primary | ICD-10-CM

## 2022-06-08 DIAGNOSIS — F31.32 BIPOLAR AFFECTIVE DISORDER, CURRENTLY DEPRESSED, MODERATE: ICD-10-CM

## 2022-06-08 PROCEDURE — 99214 OFFICE O/P EST MOD 30 MIN: CPT | Performed by: NURSE PRACTITIONER

## 2022-06-08 RX ORDER — ALPRAZOLAM 0.5 MG/1
0.5 TABLET ORAL 3 TIMES DAILY PRN
Qty: 90 TABLET | Refills: 0 | Status: SHIPPED | OUTPATIENT
Start: 2022-06-08 | End: 2022-07-05

## 2022-06-08 RX ORDER — LAMOTRIGINE 25 MG/1
50 TABLET ORAL 2 TIMES DAILY
Qty: 120 TABLET | Refills: 2 | Status: SHIPPED | OUTPATIENT
Start: 2022-06-08 | End: 2022-08-29 | Stop reason: SDUPTHER

## 2022-06-08 RX ORDER — ESCITALOPRAM OXALATE 20 MG/1
20 TABLET ORAL DAILY
Qty: 30 TABLET | Refills: 2 | Status: SHIPPED | OUTPATIENT
Start: 2022-06-08 | End: 2022-08-29 | Stop reason: SDUPTHER

## 2022-06-08 RX ORDER — HYDROXYZINE PAMOATE 25 MG/1
CAPSULE ORAL
Qty: 90 CAPSULE | Refills: 2 | Status: SHIPPED | OUTPATIENT
Start: 2022-06-08 | End: 2022-08-29 | Stop reason: SDUPTHER

## 2022-07-05 DIAGNOSIS — F41.1 GENERALIZED ANXIETY DISORDER: ICD-10-CM

## 2022-07-06 RX ORDER — ALPRAZOLAM 0.5 MG/1
0.5 TABLET ORAL 3 TIMES DAILY PRN
Qty: 90 TABLET | Refills: 0 | Status: SHIPPED | OUTPATIENT
Start: 2022-07-06 | End: 2022-08-03 | Stop reason: SDUPTHER

## 2022-08-01 DIAGNOSIS — F41.1 GENERALIZED ANXIETY DISORDER: ICD-10-CM

## 2022-08-01 RX ORDER — ALPRAZOLAM 0.5 MG/1
0.5 TABLET ORAL 3 TIMES DAILY PRN
Qty: 90 TABLET | Refills: 0 | Status: CANCELLED | OUTPATIENT
Start: 2022-08-01

## 2022-08-03 DIAGNOSIS — F41.1 GENERALIZED ANXIETY DISORDER: ICD-10-CM

## 2022-08-03 RX ORDER — ALPRAZOLAM 0.5 MG/1
0.5 TABLET ORAL 3 TIMES DAILY PRN
Qty: 90 TABLET | Refills: 0 | Status: SHIPPED | OUTPATIENT
Start: 2022-08-03 | End: 2022-08-29 | Stop reason: SDUPTHER

## 2022-08-29 DIAGNOSIS — G47.00 INSOMNIA, UNSPECIFIED TYPE: ICD-10-CM

## 2022-08-29 DIAGNOSIS — Z79.899 MEDICATION MANAGEMENT: ICD-10-CM

## 2022-08-29 DIAGNOSIS — F31.32 BIPOLAR AFFECTIVE DISORDER, CURRENTLY DEPRESSED, MODERATE: ICD-10-CM

## 2022-08-29 DIAGNOSIS — F33.1 MODERATE EPISODE OF RECURRENT MAJOR DEPRESSIVE DISORDER: ICD-10-CM

## 2022-08-29 DIAGNOSIS — F41.1 GENERALIZED ANXIETY DISORDER: ICD-10-CM

## 2022-08-29 RX ORDER — HYDROXYZINE PAMOATE 25 MG/1
CAPSULE ORAL
Qty: 90 CAPSULE | Refills: 2 | Status: SHIPPED | OUTPATIENT
Start: 2022-08-29 | End: 2022-09-26 | Stop reason: SDUPTHER

## 2022-08-29 RX ORDER — ESCITALOPRAM OXALATE 20 MG/1
20 TABLET ORAL DAILY
Qty: 30 TABLET | Refills: 2 | Status: SHIPPED | OUTPATIENT
Start: 2022-08-29 | End: 2022-09-26 | Stop reason: SDUPTHER

## 2022-08-29 RX ORDER — ALPRAZOLAM 0.5 MG/1
0.5 TABLET ORAL 3 TIMES DAILY PRN
Qty: 90 TABLET | Refills: 0 | Status: SHIPPED | OUTPATIENT
Start: 2022-08-29 | End: 2022-09-26 | Stop reason: SDUPTHER

## 2022-08-29 RX ORDER — LAMOTRIGINE 25 MG/1
50 TABLET ORAL 2 TIMES DAILY
Qty: 120 TABLET | Refills: 2 | Status: SHIPPED | OUTPATIENT
Start: 2022-08-29 | End: 2022-09-26 | Stop reason: SDUPTHER

## 2022-09-08 NOTE — PROGRESS NOTES
Angel Weston is a 51 y.o. male who presents today for follow up    Chief Complaint:  Bipolar disorder, depression, anxiety    History of Present Illness:   History of Present Illness   Today is a follow-up visit.  He is taking his medication as prescribed, denies side effects. He is having high blood pressure problems. PCP at Dr. Peres in [place], [place], he is following with her for HTN. He had a dog in the back yard, and it growled at him, increased anxiety.  Depression rated 6/10, anxiety rated 7/10, moods rated 6/10, with 10 being the worst  Sleep is ok, some nights are good and some nights aren't.getting about 5 to 6 hours a night, denies NM.   Appetite is good, he is trying to lose weight.   Denies SI/HI/AVH.  Denies thoughts of self-harm.  Chronic health issues, no acute physical or medical issues today        The following portions of the patient's history were reviewed and updated as appropriate: allergies, current medications, past family history, past medical history, past social history, past surgical history and problem list.      Past Medical History:  History reviewed. No pertinent past medical history.    Social History:  Social History     Socioeconomic History   • Marital status: Single   Tobacco Use   • Smoking status: Never Smoker   • Smokeless tobacco: Never Used   Vaping Use   • Vaping Use: Never used       Family History:  History reviewed. No pertinent family history.    Past Surgical History:  History reviewed. No pertinent surgical history.    Problem List:  There is no problem list on file for this patient.      Allergy:   No Known Allergies     Current Medications:   Current Outpatient Medications   Medication Sig Dispense Refill   • ALPRAZolam (XANAX) 0.5 MG tablet Take 1 tablet by mouth 3 (Three) Times a Day As Needed for Anxiety. 90 tablet 0   • amLODIPine (NORVASC) 10 MG tablet      • amLODIPine Besylate (NORVASC PO) Take  by mouth.     • escitalopram (Lexapro) 20 MG tablet  "Take 1 tablet by mouth Daily. 30 tablet 2   • flecainide (TAMBOCOR) 50 MG tablet      • gabapentin (NEURONTIN) 800 MG tablet Take 800 mg by mouth 2 (Two) Times a Day.     • gemfibrozil (LOPID) 600 MG tablet      • HYDROcodone-acetaminophen (NORCO)  MG per tablet Take 1 tablet by mouth 2 (Two) Times a Day.     • hydrOXYzine pamoate (VISTARIL) 25 MG capsule Take 1 capsule up to 3 times daily for anxiety. 90 capsule 2   • ketorolac (TORADOL) 10 MG tablet Take 1 tablet by mouth Every 6 (Six) Hours As Needed for Moderate Pain . 12 tablet 0   • lamoTRIgine (LaMICtal) 25 MG tablet Take 2 tablets by mouth 2 (Two) Times a Day. 120 tablet 2   • losartan-hydrochlorothiazide (HYZAAR) 50-12.5 MG per tablet      • metoprolol tartrate (LOPRESSOR) 25 MG tablet      • metoprolol tartrate (LOPRESSOR) 50 MG tablet Take 25 mg by mouth 2 (Two) Times a Day.     • omeprazole (priLOSEC) 20 MG capsule Take 20 mg by mouth Daily.     • orphenadrine (NORFLEX) 100 MG 12 hr tablet Take 1 tablet by mouth 2 (Two) Times a Day. 20 tablet 0   • vitamin D (ERGOCALCIFEROL) 1.25 MG (14870 UT) capsule capsule        No current facility-administered medications for this visit.       Review of Symptoms:    Review of Systems   Neurological: Positive for memory problem.   Psychiatric/Behavioral: Positive for sleep disturbance and depressed mood. Negative for hallucinations, self-injury and suicidal ideas. The patient is nervous/anxious.    All other systems reviewed and are negative.      Objective   Physical Exam:   Blood pressure 140/98, pulse 105, height 177.8 cm (70\"), weight 117 kg (257 lb).  Body mass index is 36.88 kg/m².    Appearance:  male appears stated age, no acute distress noted.    Gait, Station, Strength: Steady, posture erect, WNL      Mental Status Exam: 9/26/22  Hygiene:   good  Cooperation:  Cooperative  Eye Contact:  Good  Psychomotor Behavior:  Appropriate  Affect:  Appropriate  Mood: normal  Hopelessness: Denies  Speech: "  Normal  Thought Process:  Linear  Thought Content:  Mood congruent  Suicidal:  None  Homicidal:  None  Hallucinations:  None  Delusion:  None  Memory:  Deficits  Orientation:  Person, Place, Time and Situation  Reliability:  fair  Insight:  Fair  Judgement:  Fair  Impulse Control:  Fair  Physical/Medical Issues:  Yes HTN, GERD,     PHQ-Score Total:  PHQ-9 Total Score:      Lab Results:   No visits with results within 1 Month(s) from this visit.   Latest known visit with results is:   Orders Only on 01/10/2022   Component Date Value Ref Range Status   • External Amphetamine Screen Urine 01/10/2022 Negative   Final   • Amphetamine Cut-Off 01/10/2022 1000ng/ml   Final   • External Benzodiazepine Screen Uri* 01/10/2022 Positive   Final   • Benzodiazipine Cut-Off 01/10/2022 300ng/ml   Final   • External Cocaine Screen Urine 01/10/2022 Negative   Final   • Cocaine Cut-Off 01/10/2022 300ng/ml   Final   • External THC Screen Urine 01/10/2022 Positive   Final   • THC Cut-Off 01/10/2022 50ng/ml   Final   • External Methadone Screen Urine 01/10/2022 Negative   Final   • Methadone Cut-Off 01/10/2022 300ng/ml   Final   • External Methamphetamine Screen Ur* 01/10/2022 Negative   Final   • Methamphetamine Cut-Off 01/10/2022 1000ng/ml   Final   • External Oxycodone Screen Urine 01/10/2022 Negative   Final   • Oxycodone Cut-Off 01/10/2022 100ng/ml   Final   • External Buprenorphine Screen Urine 01/10/2022 Negative   Final   • Buprenorphine Cut-Off 01/10/2022 10ng/ml   Final   • External MDMA 01/10/2022 Negative   Final   • MDMA Cut-Off 01/10/2022 500ng/ml   Final   • External Opiates Screen Urine 01/10/2022 Positive   Final   • Opiates Cut-Off 01/10/2022 300ng/ml   Final       Assessment & Plan   Problems Addressed this Visit    None     Visit Diagnoses     Generalized anxiety disorder    -  Primary    Relevant Medications    lamoTRIgine (LaMICtal) 25 MG tablet    hydrOXYzine pamoate (VISTARIL) 25 MG capsule    escitalopram  (Lexapro) 20 MG tablet    ALPRAZolam (XANAX) 0.5 MG tablet    Moderate episode of recurrent major depressive disorder (HCC)        Relevant Medications    lamoTRIgine (LaMICtal) 25 MG tablet    hydrOXYzine pamoate (VISTARIL) 25 MG capsule    escitalopram (Lexapro) 20 MG tablet    ALPRAZolam (XANAX) 0.5 MG tablet    Other insomnia        Medication management        Relevant Medications    lamoTRIgine (LaMICtal) 25 MG tablet    escitalopram (Lexapro) 20 MG tablet    Insomnia, unspecified type        Relevant Medications    lamoTRIgine (LaMICtal) 25 MG tablet    hydrOXYzine pamoate (VISTARIL) 25 MG capsule    Bipolar affective disorder, currently depressed, moderate (HCC)        Relevant Medications    lamoTRIgine (LaMICtal) 25 MG tablet    hydrOXYzine pamoate (VISTARIL) 25 MG capsule    escitalopram (Lexapro) 20 MG tablet    ALPRAZolam (XANAX) 0.5 MG tablet      Diagnoses       Codes Comments    Generalized anxiety disorder    -  Primary ICD-10-CM: F41.1  ICD-9-CM: 300.02     Moderate episode of recurrent major depressive disorder (HCC)     ICD-10-CM: F33.1  ICD-9-CM: 296.32     Other insomnia     ICD-10-CM: G47.09  ICD-9-CM: 780.52     Medication management     ICD-10-CM: Z79.899  ICD-9-CM: V58.69     Insomnia, unspecified type     ICD-10-CM: G47.00  ICD-9-CM: 780.52     Bipolar affective disorder, currently depressed, moderate (HCC)     ICD-10-CM: F31.32  ICD-9-CM: 296.52         Social History     Tobacco Use   Smoking Status Never Smoker   Smokeless Tobacco Never Used     NIKHIL reviewed and appropriate. Patient counseled on use of controlled substances.       -The benefits of a healthy diet and exercise were discussed with patient, especially the positive effects they have on mental health. Patient encouraged to consider lifestyle modification regarding  diet and exercise patterns to maximize results of mental health treatment.  -Reviewed previous available documentation  -Reviewed most recent available labs    -This APRN has discussed that a very slow dose titration when starting, or changing doses, of lamotrigine may reduce the incidence of skin rash and other side effects.  The dosage should not be titrated upwards or increased faster than recommended due to the possibility of the discussed side effects and risk of development of a skin rash (which can become life threatening).  This APRN has also discussed that if the patient stops taking the lamotrigine for 5 days or longer, it will be necessary to restart the drug with an initial dose titration, as rashes have been reported on reexposure.  If the patient/guardian and Provider decide to stop the lamotrigine, the patient/guardian will follow the directions of this APRN/this office as a guided taper over about two weeks is appropriate due to the risk of relapse in bipolar disorder with those with bipolar disorder, the risk of seizures in those with epilepsy, and discontinuation symptoms upon rapid discontinuation of lamotrigine. The patient/guardian verbalizes understanding of benefits and risks as discussed, the patient/guardian feels the benefits outweigh the risks and is agreeable to continue/take lamotrigine as discussed.  The patient/guardian is advised should any side effects or rash develops they are to stop the lamotrigine immediately and contact this APRN/this office or go to the emergency department immediately.  The patient/guardian verbalizes understanding and agreement with treatment plan in their own words.  He reports he has been taking Lamictal 25 mg twice daily.   -I've explained to him that drugs of the SSRI class can have side effects such as weight gain, sexual dysfunction, insomnia, headache, nausea. These medications are generally effective at alleviating symptoms of anxiety and/or depression. Let me know if significant side effects do occur.    -Discussed risks of taking benzodiazapine's with narcotics, including respiratory depression and  death, he verbalizes understanding. Instructed to take the least amount of pain medication and benzodiazapine's as possible to manage pain and anxiety.    The patient is being prescribed a controlled substance as part of the treatment plan. The patient has been educated of appropriate use of the medication, including risks and side effects such as somnolence, limited ability to drive and/or work or function safely, potential for dependence, respiratory depression, falls, change in blood pressure, changes in heart rhythm or heart rate, activation of other mental illnesses, and overdose among others. Patient is also informed that the medication is to be used by the patient only, and to avoid any combined use of ETOH, or other substances, with this medication unless prescribed and as directed by a Provider.  The patient verbalized understanding and agreement with this in their own words.    The patient is on a long-term benzodiazepine therapy which is needed for stable, consistent, and improved quality of life.  We have discussed potential risks and side effects including early onset dementia, addiction with continued use, sedation, fatigue, depression, dizziness, ataxia, slurred speech, weakness, forgetfulness, confusion, hyperexcitability, nervousness, hallucinations, cheri or hypomania, hypotension, hypersalivation, dry mouth, respiratory depression especially when taken with CNS depressants and in overdose, rare hepatic dysfunction, rare renal dysfunction, blood dyscrasias, increased risk of falls, and impaired driving.  The patient is advised to not drive when taking a controlled substance.  The patient is also informed that the medication is to be used by the patient only, it is to be taken only as prescribed/directed, and to avoid any combined use of alcohol/ETOH or other substances unless prescribed and as directed by a Provider.  The patient verbalizes understanding and willingness in their own words to accept  the potential side effects and risks for a better quality of life, and is currently in compliance and agreement with the plan of care.  A controlled substance agreement is on file, and the patient is in agreement with the terms of the controlled substance agreement.  The patient is advised that NIKHIL reports will be reviewed periodically, as well as random drug screens will be performed periodically, and as needed.        Visit Diagnoses:    ICD-10-CM ICD-9-CM   1. Generalized anxiety disorder  F41.1 300.02   2. Moderate episode of recurrent major depressive disorder (HCC)  F33.1 296.32   3. Other insomnia  G47.09 780.52   4. Medication management  Z79.899 V58.69   5. Insomnia, unspecified type  G47.00 780.52   6. Bipolar affective disorder, currently depressed, moderate (HCC)  F31.32 296.52         TREATMENT PLAN/GOALS: Continue supportive psychotherapy efforts and medications as indicated. Treatment and medication options discussed during today's visit. Patient acknowledged and verbally consented to continue with current treatment plan and was educated on the importance of compliance with treatment and follow-up appointments.    MEDICATION ISSUES:  Discussed medication options and treatment plan of prescribed medication as well as the risks, benefits, and side effects including potential falls, possible impaired driving and metabolic adversities among others. Patient is agreeable to call the office with any worsening of symptoms or onset of side effects. Patient is agreeable to call 911 or go to the nearest ER should he/she begin having SI/HI.     MEDS ORDERED DURING VISIT:  New Medications Ordered This Visit   Medications   • lamoTRIgine (LaMICtal) 25 MG tablet     Sig: Take 2 tablets by mouth 2 (Two) Times a Day.     Dispense:  120 tablet     Refill:  2   • hydrOXYzine pamoate (VISTARIL) 25 MG capsule     Sig: Take 1 capsule up to 3 times daily for anxiety.     Dispense:  90 capsule     Refill:  2   •  escitalopram (Lexapro) 20 MG tablet     Sig: Take 1 tablet by mouth Daily.     Dispense:  30 tablet     Refill:  2   • ALPRAZolam (XANAX) 0.5 MG tablet     Sig: Take 1 tablet by mouth 3 (Three) Times a Day As Needed for Anxiety.     Dispense:  90 tablet     Refill:  0     -Continue Lamictal 25 mg take 2 tablest twice a day for bipolar disorder.  -Continue Lexapro 20 mg tablet, take 1 tablet daily for depression  -Continue hydroxyzine 25 mg capsule take 1 capsule up to 3 times a day for anxiety.  -Continue Xanax 0.5 mg tablet, take 1 tablet 3 times a day, only as needed for anxiety.    -Continue psychotherapy.    Return in about 3 months (around 12/26/2022).       Prognosis: Guarded dependent on medication/follow up and treatment plan compliance.  Functionality: pt showing improvements in important areas of daily functioning.     Short-term goals: Patient will adhere to medication regimen and note continued improvement in symptoms over the next 3 months.   Long-term goals: Patient will be adherent to medication management and psychotherapy with continued improvement in symptoms over the next 6 months    I spent 30 minutes caring for Will on this date of service. This time includes time spent by me in the following activities: preparing for the visit, obtaining and/or reviewing a separately obtained history, performing a medically appropriate examination and/or evaluation, counseling and educating the patient/family/caregiver, ordering medications, tests, or procedures and documenting information in the medical record          This document has been electronically signed by ALBERT Preciado   September 26, 2022 13:08 EDT    Part of this note may be an electronic transcription/translation of spoken language to printed text using the Dragon Dictation System.

## 2022-09-26 ENCOUNTER — OFFICE VISIT (OUTPATIENT)
Dept: PSYCHIATRY | Facility: CLINIC | Age: 52
End: 2022-09-26

## 2022-09-26 VITALS
HEART RATE: 105 BPM | HEIGHT: 70 IN | BODY MASS INDEX: 36.79 KG/M2 | WEIGHT: 257 LBS | DIASTOLIC BLOOD PRESSURE: 98 MMHG | SYSTOLIC BLOOD PRESSURE: 140 MMHG

## 2022-09-26 DIAGNOSIS — F33.1 MODERATE EPISODE OF RECURRENT MAJOR DEPRESSIVE DISORDER: ICD-10-CM

## 2022-09-26 DIAGNOSIS — F41.1 GENERALIZED ANXIETY DISORDER: Primary | ICD-10-CM

## 2022-09-26 DIAGNOSIS — G47.09 OTHER INSOMNIA: ICD-10-CM

## 2022-09-26 DIAGNOSIS — Z79.899 MEDICATION MANAGEMENT: ICD-10-CM

## 2022-09-26 DIAGNOSIS — F31.32 BIPOLAR AFFECTIVE DISORDER, CURRENTLY DEPRESSED, MODERATE: ICD-10-CM

## 2022-09-26 DIAGNOSIS — G47.00 INSOMNIA, UNSPECIFIED TYPE: ICD-10-CM

## 2022-09-26 PROCEDURE — 99214 OFFICE O/P EST MOD 30 MIN: CPT | Performed by: NURSE PRACTITIONER

## 2022-09-26 RX ORDER — LAMOTRIGINE 25 MG/1
50 TABLET ORAL 2 TIMES DAILY
Qty: 120 TABLET | Refills: 2 | Status: SHIPPED | OUTPATIENT
Start: 2022-09-26 | End: 2023-01-16 | Stop reason: SDUPTHER

## 2022-09-26 RX ORDER — ESCITALOPRAM OXALATE 20 MG/1
20 TABLET ORAL DAILY
Qty: 30 TABLET | Refills: 2 | Status: SHIPPED | OUTPATIENT
Start: 2022-09-26 | End: 2023-01-16 | Stop reason: SDUPTHER

## 2022-09-26 RX ORDER — HYDROXYZINE PAMOATE 25 MG/1
CAPSULE ORAL
Qty: 90 CAPSULE | Refills: 2 | Status: SHIPPED | OUTPATIENT
Start: 2022-09-26 | End: 2023-01-16 | Stop reason: SDUPTHER

## 2022-09-26 RX ORDER — ALPRAZOLAM 0.5 MG/1
0.5 TABLET ORAL 3 TIMES DAILY PRN
Qty: 90 TABLET | Refills: 0 | Status: SHIPPED | OUTPATIENT
Start: 2022-09-26 | End: 2022-10-24 | Stop reason: SDUPTHER

## 2022-10-24 DIAGNOSIS — F41.1 GENERALIZED ANXIETY DISORDER: ICD-10-CM

## 2022-10-24 RX ORDER — ALPRAZOLAM 0.5 MG/1
0.5 TABLET ORAL 3 TIMES DAILY PRN
Qty: 90 TABLET | Refills: 0 | Status: SHIPPED | OUTPATIENT
Start: 2022-10-24 | End: 2022-11-21 | Stop reason: SDUPTHER

## 2022-11-21 DIAGNOSIS — F33.1 MODERATE EPISODE OF RECURRENT MAJOR DEPRESSIVE DISORDER: ICD-10-CM

## 2022-11-21 DIAGNOSIS — Z79.899 MEDICATION MANAGEMENT: ICD-10-CM

## 2022-11-21 DIAGNOSIS — F31.32 BIPOLAR AFFECTIVE DISORDER, CURRENTLY DEPRESSED, MODERATE: ICD-10-CM

## 2022-11-21 DIAGNOSIS — G47.00 INSOMNIA, UNSPECIFIED TYPE: ICD-10-CM

## 2022-11-21 DIAGNOSIS — F41.1 GENERALIZED ANXIETY DISORDER: ICD-10-CM

## 2022-11-21 RX ORDER — ESCITALOPRAM OXALATE 20 MG/1
20 TABLET ORAL DAILY
Qty: 30 TABLET | Refills: 2 | Status: CANCELLED | OUTPATIENT
Start: 2022-11-21 | End: 2023-11-21

## 2022-11-21 RX ORDER — ALPRAZOLAM 0.5 MG/1
0.5 TABLET ORAL 3 TIMES DAILY PRN
Qty: 90 TABLET | Refills: 0 | Status: SHIPPED | OUTPATIENT
Start: 2022-11-21 | End: 2022-12-19 | Stop reason: SDUPTHER

## 2022-11-21 RX ORDER — LAMOTRIGINE 25 MG/1
50 TABLET ORAL 2 TIMES DAILY
Qty: 120 TABLET | Refills: 2 | Status: CANCELLED | OUTPATIENT
Start: 2022-11-21

## 2022-12-19 ENCOUNTER — TELEPHONE (OUTPATIENT)
Dept: PSYCHIATRY | Facility: CLINIC | Age: 52
End: 2022-12-19

## 2022-12-19 DIAGNOSIS — F41.1 GENERALIZED ANXIETY DISORDER: ICD-10-CM

## 2022-12-19 RX ORDER — ALPRAZOLAM 0.5 MG/1
0.5 TABLET ORAL 3 TIMES DAILY PRN
Qty: 90 TABLET | Refills: 0 | Status: SHIPPED | OUTPATIENT
Start: 2022-12-19 | End: 2023-01-16 | Stop reason: SDUPTHER

## 2023-01-16 ENCOUNTER — TELEPHONE (OUTPATIENT)
Dept: PSYCHIATRY | Facility: CLINIC | Age: 53
End: 2023-01-16
Payer: MEDICARE

## 2023-01-16 DIAGNOSIS — G47.00 INSOMNIA, UNSPECIFIED TYPE: ICD-10-CM

## 2023-01-16 DIAGNOSIS — F33.1 MODERATE EPISODE OF RECURRENT MAJOR DEPRESSIVE DISORDER: ICD-10-CM

## 2023-01-16 DIAGNOSIS — Z79.899 MEDICATION MANAGEMENT: ICD-10-CM

## 2023-01-16 DIAGNOSIS — F31.32 BIPOLAR AFFECTIVE DISORDER, CURRENTLY DEPRESSED, MODERATE: ICD-10-CM

## 2023-01-16 DIAGNOSIS — F41.1 GENERALIZED ANXIETY DISORDER: ICD-10-CM

## 2023-01-16 RX ORDER — LAMOTRIGINE 25 MG/1
50 TABLET ORAL 2 TIMES DAILY
Qty: 120 TABLET | Refills: 2 | Status: SHIPPED | OUTPATIENT
Start: 2023-01-16 | End: 2023-02-27 | Stop reason: SDUPTHER

## 2023-01-16 RX ORDER — ESCITALOPRAM OXALATE 20 MG/1
20 TABLET ORAL DAILY
Qty: 30 TABLET | Refills: 2 | Status: SHIPPED | OUTPATIENT
Start: 2023-01-16 | End: 2023-02-27 | Stop reason: SDUPTHER

## 2023-01-16 RX ORDER — ALPRAZOLAM 0.5 MG/1
0.5 TABLET ORAL 3 TIMES DAILY PRN
Qty: 90 TABLET | Refills: 0 | Status: SHIPPED | OUTPATIENT
Start: 2023-01-16 | End: 2023-02-13 | Stop reason: SDUPTHER

## 2023-01-16 RX ORDER — HYDROXYZINE PAMOATE 25 MG/1
CAPSULE ORAL
Qty: 90 CAPSULE | Refills: 2 | Status: SHIPPED | OUTPATIENT
Start: 2023-01-16 | End: 2023-02-27 | Stop reason: SDUPTHER

## 2023-01-16 NOTE — TELEPHONE ENCOUNTER
Called to let patient know he had prescriptions sent over to the pharmacy. No answer and no voicemail set up.

## 2023-02-13 DIAGNOSIS — F31.32 BIPOLAR AFFECTIVE DISORDER, CURRENTLY DEPRESSED, MODERATE: ICD-10-CM

## 2023-02-13 DIAGNOSIS — Z79.899 MEDICATION MANAGEMENT: ICD-10-CM

## 2023-02-13 DIAGNOSIS — F33.1 MODERATE EPISODE OF RECURRENT MAJOR DEPRESSIVE DISORDER: ICD-10-CM

## 2023-02-13 DIAGNOSIS — F41.1 GENERALIZED ANXIETY DISORDER: ICD-10-CM

## 2023-02-13 DIAGNOSIS — G47.00 INSOMNIA, UNSPECIFIED TYPE: ICD-10-CM

## 2023-02-13 RX ORDER — ESCITALOPRAM OXALATE 20 MG/1
20 TABLET ORAL DAILY
Qty: 30 TABLET | Refills: 2 | OUTPATIENT
Start: 2023-02-13 | End: 2024-02-13

## 2023-02-13 RX ORDER — HYDROXYZINE PAMOATE 25 MG/1
CAPSULE ORAL
Qty: 90 CAPSULE | Refills: 2 | OUTPATIENT
Start: 2023-02-13

## 2023-02-13 RX ORDER — LAMOTRIGINE 25 MG/1
50 TABLET ORAL 2 TIMES DAILY
Qty: 120 TABLET | Refills: 2 | OUTPATIENT
Start: 2023-02-13

## 2023-02-13 RX ORDER — ALPRAZOLAM 0.5 MG/1
0.5 TABLET ORAL 3 TIMES DAILY PRN
Qty: 90 TABLET | Refills: 0 | Status: SHIPPED | OUTPATIENT
Start: 2023-02-13 | End: 2023-03-13 | Stop reason: SDUPTHER

## 2023-02-17 NOTE — PROGRESS NOTES
Angel Weston is a 52 y.o. male who presents today for follow up    Chief Complaint:  Bipolar disorder, depression, anxiety    History of Present Illness:   History of Present Illness  Today is a follow-up visit.  He is taking his medication as prescribed, denies side effects.  Things are going good.   Depression rated 4/10, anxiety rated 5/10, moods rated 3/10, with 10 being the worst  Sleep is ok, getting about 7 hours a night, denies NM.   Appetite is good, he continues to try to lose weight.   Denies SI/HI/AVH.  Denies thoughts of self-harm.  Chronic health issues, no acute physical or medical issues today       The following portions of the patient's history were reviewed and updated as appropriate: allergies, current medications, past family history, past medical history, past social history, past surgical history and problem list.      Past Medical History:  History reviewed. No pertinent past medical history.    Social History:  Social History     Socioeconomic History   • Marital status: Single   Tobacco Use   • Smoking status: Never   • Smokeless tobacco: Never   Vaping Use   • Vaping Use: Never used       Family History:  History reviewed. No pertinent family history.    Past Surgical History:  History reviewed. No pertinent surgical history.    Problem List:  There is no problem list on file for this patient.      Allergy:   No Known Allergies     Current Medications:   Current Outpatient Medications   Medication Sig Dispense Refill   • ALPRAZolam (XANAX) 0.5 MG tablet Take 1 tablet by mouth 3 (Three) Times a Day As Needed for Anxiety. 90 tablet 0   • amLODIPine (NORVASC) 10 MG tablet      • cloNIDine (CATAPRES) 0.1 MG tablet      • escitalopram (Lexapro) 20 MG tablet Take 1 tablet by mouth Daily. 30 tablet 2   • flecainide (TAMBOCOR) 50 MG tablet      • gabapentin (NEURONTIN) 800 MG tablet Take 800 mg by mouth 2 (Two) Times a Day.     • gemfibrozil (LOPID) 600 MG tablet      •  "hydroCHLOROthiazide (HYDRODIURIL) 25 MG tablet      • HYDROcodone-acetaminophen (NORCO)  MG per tablet Take 1 tablet by mouth 2 (Two) Times a Day.     • hydrOXYzine pamoate (VISTARIL) 25 MG capsule Take 1 capsule up to 3 times daily for anxiety. 90 capsule 2   • ibuprofen (ADVIL,MOTRIN) 800 MG tablet      • lamoTRIgine (LaMICtal) 25 MG tablet Take 2 tablets by mouth 2 (Two) Times a Day. 120 tablet 2   • losartan-hydrochlorothiazide (HYZAAR) 50-12.5 MG per tablet      • metoprolol tartrate (LOPRESSOR) 25 MG tablet      • omeprazole (priLOSEC) 20 MG capsule Take 20 mg by mouth Daily.     • tiZANidine (ZANAFLEX) 4 MG tablet      • vitamin D (ERGOCALCIFEROL) 1.25 MG (06207 UT) capsule capsule        No current facility-administered medications for this visit.       Review of Symptoms:    Review of Systems   Neurological: Positive for memory problem.   Psychiatric/Behavioral: Positive for sleep disturbance and depressed mood. Negative for dysphoric mood, hallucinations, self-injury, suicidal ideas and stress. The patient is nervous/anxious.    All other systems reviewed and are negative.      Objective   Physical Exam:   Blood pressure 122/80, pulse 79, height 177.8 cm (70\"), weight 113 kg (249 lb).  Body mass index is 35.73 kg/m².    Appearance:  male appears stated age, no acute distress noted.    Gait, Station, Strength: Steady, posture erect, WNL      Mental Status Exam: 2/27/23  Hygiene:   good  Cooperation:  Cooperative  Eye Contact:  Good  Psychomotor Behavior:  Appropriate  Affect:  Appropriate  Mood: normal  Hopelessness: Denies  Speech:  Normal  Thought Process:  Linear  Thought Content:  Mood congruent  Suicidal:  None  Homicidal:  None  Hallucinations:  None  Delusion:  None  Memory:  Deficits  Orientation:  Person, Place, Time and Situation  Reliability:  fair  Insight:  Fair  Judgement:  Fair  Impulse Control:  Fair  Physical/Medical Issues:  Yes HTN, GERD,     PHQ-Score Total:  PHQ-9 Total " Score:      Lab Results:   Office Visit on 02/27/2023   Component Date Value Ref Range Status   • External Amphetamine Screen Urine 02/27/2023 Negative   Final   • External Benzodiazepine Screen Uri* 02/27/2023 Positive (A)   Final   • External Cocaine Screen Urine 02/27/2023 Negative   Final   • External THC Screen Urine 02/27/2023 Positive (A)   Final   • External Methadone Screen Urine 02/27/2023 Negative   Final   • External Methamphetamine Screen Ur* 02/27/2023 Negative   Final   • External Oxycodone Screen Urine 02/27/2023 Negative   Final   • External Buprenorphine Screen Urine 02/27/2023 Negative   Final   • External MDMA 02/27/2023 Negative   Final   • External Opiates Screen Urine 02/27/2023 Positive (A)   Final       Assessment & Plan   Problems Addressed this Visit    None  Visit Diagnoses     Generalized anxiety disorder    -  Primary    Relevant Medications    lamoTRIgine (LaMICtal) 25 MG tablet    hydrOXYzine pamoate (VISTARIL) 25 MG capsule    escitalopram (Lexapro) 20 MG tablet    Moderate episode of recurrent major depressive disorder (HCC)        Relevant Medications    lamoTRIgine (LaMICtal) 25 MG tablet    hydrOXYzine pamoate (VISTARIL) 25 MG capsule    escitalopram (Lexapro) 20 MG tablet    Medication management        Relevant Medications    lamoTRIgine (LaMICtal) 25 MG tablet    escitalopram (Lexapro) 20 MG tablet    Other Relevant Orders    [facility] Drug Screen (Completed)    Insomnia, unspecified type        Relevant Medications    lamoTRIgine (LaMICtal) 25 MG tablet    hydrOXYzine pamoate (VISTARIL) 25 MG capsule    Bipolar affective disorder, currently depressed, moderate (HCC)        Relevant Medications    lamoTRIgine (LaMICtal) 25 MG tablet    hydrOXYzine pamoate (VISTARIL) 25 MG capsule    escitalopram (Lexapro) 20 MG tablet      Diagnoses       Codes Comments    Generalized anxiety disorder    -  Primary ICD-10-CM: F41.1  ICD-9-CM: 300.02     Moderate episode of recurrent major  depressive disorder (HCC)     ICD-10-CM: F33.1  ICD-9-CM: 296.32     Medication management     ICD-10-CM: Z79.899  ICD-9-CM: V58.69     Insomnia, unspecified type     ICD-10-CM: G47.00  ICD-9-CM: 780.52     Bipolar affective disorder, currently depressed, moderate (HCC)     ICD-10-CM: F31.32  ICD-9-CM: 296.52         Social History     Tobacco Use   Smoking Status Never   Smokeless Tobacco Never     NIKHIL reviewed and appropriate. Patient counseled on use of controlled substances.       -The benefits of a healthy diet and exercise were discussed with patient, especially the positive effects they have on mental health. Patient encouraged to consider lifestyle modification regarding  diet and exercise patterns to maximize results of mental health treatment.  -Reviewed previous available documentation  -Reviewed most recent available labs   -This APRN has discussed that a very slow dose titration when starting, or changing doses, of lamotrigine may reduce the incidence of skin rash and other side effects.  The dosage should not be titrated upwards or increased faster than recommended due to the possibility of the discussed side effects and risk of development of a skin rash (which can become life threatening).  This APRN has also discussed that if the patient stops taking the lamotrigine for 5 days or longer, it will be necessary to restart the drug with an initial dose titration, as rashes have been reported on reexposure.  If the patient/guardian and Provider decide to stop the lamotrigine, the patient/guardian will follow the directions of this APRN/this office as a guided taper over about two weeks is appropriate due to the risk of relapse in bipolar disorder with those with bipolar disorder, the risk of seizures in those with epilepsy, and discontinuation symptoms upon rapid discontinuation of lamotrigine. The patient/guardian verbalizes understanding of benefits and risks as discussed, the patient/guardian feels  the benefits outweigh the risks and is agreeable to continue/take lamotrigine as discussed.  The patient/guardian is advised should any side effects or rash develops they are to stop the lamotrigine immediately and contact this APRN/this office or go to the emergency department immediately.  The patient/guardian verbalizes understanding and agreement with treatment plan in their own words.  He reports he has been taking Lamictal 25 mg twice daily.   -I've explained to him that drugs of the SSRI class can have side effects such as weight gain, sexual dysfunction, insomnia, headache, nausea. These medications are generally effective at alleviating symptoms of anxiety and/or depression. Let me know if significant side effects do occur.    -Discussed risks of taking benzodiazapine's with narcotics, including respiratory depression and death, he verbalizes understanding. Instructed to take the least amount of pain medication and benzodiazapine's as possible to manage pain and anxiety.    The patient is being prescribed a controlled substance as part of the treatment plan. The patient has been educated of appropriate use of the medication, including risks and side effects such as somnolence, limited ability to drive and/or work or function safely, potential for dependence, respiratory depression, falls, change in blood pressure, changes in heart rhythm or heart rate, activation of other mental illnesses, and overdose among others. Patient is also informed that the medication is to be used by the patient only, and to avoid any combined use of ETOH, or other substances, with this medication unless prescribed and as directed by a Provider.  The patient verbalized understanding and agreement with this in their own words.    The patient is on a long-term benzodiazepine therapy which is needed for stable, consistent, and improved quality of life.  We have discussed potential risks and side effects including early onset  dementia, addiction with continued use, sedation, fatigue, depression, dizziness, ataxia, slurred speech, weakness, forgetfulness, confusion, hyperexcitability, nervousness, hallucinations, cheri or hypomania, hypotension, hypersalivation, dry mouth, respiratory depression especially when taken with CNS depressants and in overdose, rare hepatic dysfunction, rare renal dysfunction, blood dyscrasias, increased risk of falls, and impaired driving.  The patient is advised to not drive when taking a controlled substance.  The patient is also informed that the medication is to be used by the patient only, it is to be taken only as prescribed/directed, and to avoid any combined use of alcohol/ETOH or other substances unless prescribed and as directed by a Provider.  The patient verbalizes understanding and willingness in their own words to accept the potential side effects and risks for a better quality of life, and is currently in compliance and agreement with the plan of care.  A controlled substance agreement is on file, and the patient is in agreement with the terms of the controlled substance agreement.  The patient is advised that NIKHIL reports will be reviewed periodically, as well as random drug screens will be performed periodically, and as needed.        Visit Diagnoses:    ICD-10-CM ICD-9-CM   1. Generalized anxiety disorder  F41.1 300.02   2. Moderate episode of recurrent major depressive disorder (HCC)  F33.1 296.32   3. Medication management  Z79.899 V58.69   4. Insomnia, unspecified type  G47.00 780.52   5. Bipolar affective disorder, currently depressed, moderate (HCC)  F31.32 296.52         TREATMENT PLAN/GOALS: Continue supportive psychotherapy efforts and medications as indicated. Treatment and medication options discussed during today's visit. Patient acknowledged and verbally consented to continue with current treatment plan and was educated on the importance of compliance with treatment and follow-up  appointments.    MEDICATION ISSUES:  Discussed medication options and treatment plan of prescribed medication as well as the risks, benefits, and side effects including potential falls, possible impaired driving and metabolic adversities among others. Patient is agreeable to call the office with any worsening of symptoms or onset of side effects. Patient is agreeable to call 911 or go to the nearest ER should he/she begin having SI/HI.     MEDS ORDERED DURING VISIT:  New Medications Ordered This Visit   Medications   • lamoTRIgine (LaMICtal) 25 MG tablet     Sig: Take 2 tablets by mouth 2 (Two) Times a Day.     Dispense:  120 tablet     Refill:  2   • hydrOXYzine pamoate (VISTARIL) 25 MG capsule     Sig: Take 1 capsule up to 3 times daily for anxiety.     Dispense:  90 capsule     Refill:  2   • escitalopram (Lexapro) 20 MG tablet     Sig: Take 1 tablet by mouth Daily.     Dispense:  30 tablet     Refill:  2     -Continue Lamictal 25 mg take 2 tablest twice a day for bipolar disorder.  -Continue Lexapro 20 mg tablet, take 1 tablet daily for depression  -Continue hydroxyzine 25 mg capsule take 1 capsule up to 3 times a day for anxiety.  -Continue Xanax 0.5 mg tablet, take 1 tablet 3 times a day, only as needed for anxiety (not sent today).    -Continue psychotherapy.    Return in about 3 months (around 5/27/2023).       Prognosis: Guarded dependent on medication/follow up and treatment plan compliance.  Functionality: pt showing improvements in important areas of daily functioning.     Short-term goals: Patient will adhere to medication regimen and note continued improvement in symptoms over the next 3 months.   Long-term goals: Patient will be adherent to medication management and psychotherapy with continued improvement in symptoms over the next 6 months    I spent 30 minutes caring for Will on this date of service. This time includes time spent by me in the following activities: preparing for the visit, obtaining  and/or reviewing a separately obtained history, performing a medically appropriate examination and/or evaluation, counseling and educating the patient/family/caregiver, ordering medications, tests, or procedures and documenting information in the medical record        This document has been electronically signed by ALBERT Cameron   February 27, 2023 12:39 EST    Part of this note may be an electronic transcription/translation of spoken language to printed text using the Dragon Dictation System.

## 2023-02-27 ENCOUNTER — OFFICE VISIT (OUTPATIENT)
Dept: PSYCHIATRY | Facility: CLINIC | Age: 53
End: 2023-02-27
Payer: MEDICARE

## 2023-02-27 VITALS
HEART RATE: 79 BPM | BODY MASS INDEX: 35.65 KG/M2 | SYSTOLIC BLOOD PRESSURE: 122 MMHG | WEIGHT: 249 LBS | DIASTOLIC BLOOD PRESSURE: 80 MMHG | HEIGHT: 70 IN

## 2023-02-27 DIAGNOSIS — Z79.899 MEDICATION MANAGEMENT: ICD-10-CM

## 2023-02-27 DIAGNOSIS — F31.32 BIPOLAR AFFECTIVE DISORDER, CURRENTLY DEPRESSED, MODERATE: ICD-10-CM

## 2023-02-27 DIAGNOSIS — F41.1 GENERALIZED ANXIETY DISORDER: Primary | ICD-10-CM

## 2023-02-27 DIAGNOSIS — G47.00 INSOMNIA, UNSPECIFIED TYPE: ICD-10-CM

## 2023-02-27 DIAGNOSIS — F33.1 MODERATE EPISODE OF RECURRENT MAJOR DEPRESSIVE DISORDER: ICD-10-CM

## 2023-02-27 LAB
EXTERNAL AMPHETAMINE SCREEN URINE: NEGATIVE
EXTERNAL BENZODIAZEPINE SCREEN URINE: POSITIVE
EXTERNAL BUPRENORPHINE SCREEN URINE: NEGATIVE
EXTERNAL COCAINE SCREEN URINE: NEGATIVE
EXTERNAL MDMA: NEGATIVE
EXTERNAL METHADONE SCREEN URINE: NEGATIVE
EXTERNAL METHAMPHETAMINE SCREEN URINE: NEGATIVE
EXTERNAL OPIATES SCREEN URINE: POSITIVE
EXTERNAL OXYCODONE SCREEN URINE: NEGATIVE
EXTERNAL THC SCREEN URINE: POSITIVE

## 2023-02-27 PROCEDURE — 99214 OFFICE O/P EST MOD 30 MIN: CPT | Performed by: NURSE PRACTITIONER

## 2023-02-27 RX ORDER — HYDROXYZINE PAMOATE 25 MG/1
CAPSULE ORAL
Qty: 90 CAPSULE | Refills: 2 | Status: SHIPPED | OUTPATIENT
Start: 2023-02-27

## 2023-02-27 RX ORDER — TIZANIDINE 4 MG/1
TABLET ORAL
COMMUNITY
Start: 2023-02-09

## 2023-02-27 RX ORDER — HYDROCHLOROTHIAZIDE 25 MG/1
TABLET ORAL
COMMUNITY
Start: 2023-02-09

## 2023-02-27 RX ORDER — LAMOTRIGINE 25 MG/1
50 TABLET ORAL 2 TIMES DAILY
Qty: 120 TABLET | Refills: 2 | Status: SHIPPED | OUTPATIENT
Start: 2023-02-27

## 2023-02-27 RX ORDER — IBUPROFEN 800 MG/1
TABLET ORAL
COMMUNITY
Start: 2023-02-09

## 2023-02-27 RX ORDER — CLONIDINE HYDROCHLORIDE 0.1 MG/1
TABLET ORAL
COMMUNITY
Start: 2023-02-09

## 2023-02-27 RX ORDER — ESCITALOPRAM OXALATE 20 MG/1
20 TABLET ORAL DAILY
Qty: 30 TABLET | Refills: 2 | Status: SHIPPED | OUTPATIENT
Start: 2023-02-27 | End: 2024-02-27

## 2023-03-13 DIAGNOSIS — F41.1 GENERALIZED ANXIETY DISORDER: ICD-10-CM

## 2023-03-13 RX ORDER — ALPRAZOLAM 0.5 MG/1
0.5 TABLET ORAL 3 TIMES DAILY PRN
Qty: 90 TABLET | Refills: 0 | Status: SHIPPED | OUTPATIENT
Start: 2023-03-13

## 2023-04-10 DIAGNOSIS — F41.1 GENERALIZED ANXIETY DISORDER: ICD-10-CM

## 2023-04-10 RX ORDER — ALPRAZOLAM 0.5 MG/1
0.5 TABLET ORAL 3 TIMES DAILY PRN
Qty: 90 TABLET | Refills: 0 | Status: SHIPPED | OUTPATIENT
Start: 2023-04-10

## 2023-05-05 DIAGNOSIS — F41.1 GENERALIZED ANXIETY DISORDER: ICD-10-CM

## 2023-05-05 DIAGNOSIS — Z79.899 MEDICATION MANAGEMENT: ICD-10-CM

## 2023-05-05 DIAGNOSIS — G47.00 INSOMNIA, UNSPECIFIED TYPE: ICD-10-CM

## 2023-05-05 DIAGNOSIS — F31.32 BIPOLAR AFFECTIVE DISORDER, CURRENTLY DEPRESSED, MODERATE: ICD-10-CM

## 2023-05-05 DIAGNOSIS — F33.1 MODERATE EPISODE OF RECURRENT MAJOR DEPRESSIVE DISORDER: ICD-10-CM

## 2023-05-05 RX ORDER — LAMOTRIGINE 25 MG/1
50 TABLET ORAL 2 TIMES DAILY
Qty: 120 TABLET | Refills: 2 | Status: SHIPPED | OUTPATIENT
Start: 2023-05-05

## 2023-05-05 RX ORDER — ALPRAZOLAM 0.5 MG/1
0.5 TABLET ORAL 3 TIMES DAILY PRN
Qty: 90 TABLET | Refills: 0 | Status: SHIPPED | OUTPATIENT
Start: 2023-05-05

## 2023-05-05 RX ORDER — HYDROXYZINE PAMOATE 25 MG/1
CAPSULE ORAL
Qty: 90 CAPSULE | Refills: 2 | Status: SHIPPED | OUTPATIENT
Start: 2023-05-05

## 2023-05-18 NOTE — PROGRESS NOTES
Angel Weston is a 52 y.o. male who presents today for follow up    Chief Complaint:  Bipolar disorder, depression, anxiety    History of Present Illness:   History of Present Illness  Today is a follow-up visit. He is taking his medication as prescribed, denies side effects. He states his PCP is wanting him to wean down off of xanax, so she can increase or continue pain medications.  He states he tried cutting xanax down on his own to twice a day, he states he has had increased anxiety.   Depression rated 7/10, anxiety rated 6/10, moods rated 5/10, with 10 being the worst  Sleep is good, getting about 4-5 hours a night, denies NM. He has a lot of neck and back pain at night.  Appetite is improved.   Denies SI/HI/AVH.  Denies thoughts of self-harm.  Chronic health issues, no acute physical or medical issues today       The following portions of the patient's history were reviewed and updated as appropriate: allergies, current medications, past family history, past medical history, past social history, past surgical history and problem list.      Past Medical History:  History reviewed. No pertinent past medical history.    Social History:  Social History     Socioeconomic History   • Marital status: Single   Tobacco Use   • Smoking status: Never   • Smokeless tobacco: Never   Vaping Use   • Vaping Use: Never used       Family History:  History reviewed. No pertinent family history.    Past Surgical History:  History reviewed. No pertinent surgical history.    Problem List:  There is no problem list on file for this patient.      Allergy:   No Known Allergies     Current Medications:   Current Outpatient Medications   Medication Sig Dispense Refill   • ALPRAZolam (XANAX) 0.5 MG tablet Take 1 tablet by mouth 3 (Three) Times a Day As Needed for Anxiety. 90 tablet 0   • amLODIPine (NORVASC) 10 MG tablet      • cloNIDine (CATAPRES) 0.1 MG tablet      • escitalopram (Lexapro) 20 MG tablet Take 1 tablet by mouth  "Daily. 30 tablet 2   • flecainide (TAMBOCOR) 50 MG tablet      • gabapentin (NEURONTIN) 800 MG tablet Take 1 tablet by mouth 2 (Two) Times a Day.     • gemfibrozil (LOPID) 600 MG tablet      • hydroCHLOROthiazide (HYDRODIURIL) 25 MG tablet      • HYDROcodone-acetaminophen (NORCO)  MG per tablet Take 1 tablet by mouth 2 (Two) Times a Day.     • hydrOXYzine pamoate (VISTARIL) 25 MG capsule Take 1 capsule up to 3 times daily for anxiety. 90 capsule 2   • ibuprofen (ADVIL,MOTRIN) 800 MG tablet      • lamoTRIgine (LaMICtal) 25 MG tablet Take 2 tablets by mouth 2 (Two) Times a Day. 120 tablet 2   • losartan-hydrochlorothiazide (HYZAAR) 50-12.5 MG per tablet      • metoprolol tartrate (LOPRESSOR) 25 MG tablet      • omeprazole (priLOSEC) 20 MG capsule Take 1 capsule by mouth Daily.     • tiZANidine (ZANAFLEX) 4 MG tablet      • vitamin D (ERGOCALCIFEROL) 1.25 MG (54803 UT) capsule capsule        No current facility-administered medications for this visit.       Review of Symptoms:    Review of Systems   Musculoskeletal: Positive for arthralgias, back pain and neck pain.   Neurological: Positive for memory problem.   Psychiatric/Behavioral: Positive for dysphoric mood, sleep disturbance, depressed mood and stress. Negative for hallucinations, self-injury and suicidal ideas. The patient is nervous/anxious.    All other systems reviewed and are negative.      Objective   Physical Exam:   Blood pressure 140/98, pulse 53, height 177.8 cm (70\"), weight 115 kg (254 lb 6.4 oz).  Body mass index is 36.5 kg/m².    Appearance:  male appears stated age, no acute distress noted.    Gait, Station, Strength: Steady, posture erect, WNL      Mental Status Exam: 5/24/23  Hygiene:   good  Cooperation:  Cooperative  Eye Contact:  Good  Psychomotor Behavior:  Appropriate  Affect:  Appropriate  Mood: normal  Hopelessness: Denies  Speech:  Normal  Thought Process:  Linear  Thought Content:  Mood congruent  Suicidal:  " None  Homicidal:  None  Hallucinations:  None  Delusion:  None  Memory:  Deficits  Orientation:  Person, Place, Time and Situation  Reliability:  fair  Insight:  Fair  Judgement:  Fair  Impulse Control:  Fair       PHQ-9 Total Score: 9      Lab Results:   No visits with results within 1 Month(s) from this visit.   Latest known visit with results is:   Office Visit on 02/27/2023   Component Date Value Ref Range Status   • External Amphetamine Screen Urine 02/27/2023 Negative   Final   • External Benzodiazepine Screen Uri* 02/27/2023 Positive (A)   Final   • External Cocaine Screen Urine 02/27/2023 Negative   Final   • External THC Screen Urine 02/27/2023 Positive (A)   Final   • External Methadone Screen Urine 02/27/2023 Negative   Final   • External Methamphetamine Screen Ur* 02/27/2023 Negative   Final   • External Oxycodone Screen Urine 02/27/2023 Negative   Final   • External Buprenorphine Screen Urine 02/27/2023 Negative   Final   • External MDMA 02/27/2023 Negative   Final   • External Opiates Screen Urine 02/27/2023 Positive (A)   Final       Assessment & Plan   Problems Addressed this Visit    None  Visit Diagnoses     Generalized anxiety disorder    -  Primary    Relevant Medications    lamoTRIgine (LaMICtal) 25 MG tablet    hydrOXYzine pamoate (VISTARIL) 25 MG capsule    escitalopram (Lexapro) 20 MG tablet    ALPRAZolam (XANAX) 0.5 MG tablet    Moderate episode of recurrent major depressive disorder        Relevant Medications    lamoTRIgine (LaMICtal) 25 MG tablet    hydrOXYzine pamoate (VISTARIL) 25 MG capsule    escitalopram (Lexapro) 20 MG tablet    ALPRAZolam (XANAX) 0.5 MG tablet    Medication management        Relevant Medications    lamoTRIgine (LaMICtal) 25 MG tablet    escitalopram (Lexapro) 20 MG tablet    Other insomnia        Insomnia, unspecified type        Relevant Medications    lamoTRIgine (LaMICtal) 25 MG tablet    hydrOXYzine pamoate (VISTARIL) 25 MG capsule    Bipolar affective  disorder, currently depressed, moderate        Relevant Medications    lamoTRIgine (LaMICtal) 25 MG tablet    hydrOXYzine pamoate (VISTARIL) 25 MG capsule    escitalopram (Lexapro) 20 MG tablet    ALPRAZolam (XANAX) 0.5 MG tablet      Diagnoses       Codes Comments    Generalized anxiety disorder    -  Primary ICD-10-CM: F41.1  ICD-9-CM: 300.02     Moderate episode of recurrent major depressive disorder     ICD-10-CM: F33.1  ICD-9-CM: 296.32     Medication management     ICD-10-CM: Z79.899  ICD-9-CM: V58.69     Other insomnia     ICD-10-CM: G47.09  ICD-9-CM: 780.52     Insomnia, unspecified type     ICD-10-CM: G47.00  ICD-9-CM: 780.52     Bipolar affective disorder, currently depressed, moderate     ICD-10-CM: F31.32  ICD-9-CM: 296.52         Social History     Tobacco Use   Smoking Status Never   Smokeless Tobacco Never     NIKHIL reviewed and appropriate. Patient counseled on use of controlled substances.       -The benefits of a healthy diet and exercise were discussed with patient, especially the positive effects they have on mental health. Patient encouraged to consider lifestyle modification regarding  diet and exercise patterns to maximize results of mental health treatment.  -Reviewed previous available documentation  -Reviewed most recent available labs   -This APRN has discussed that a very slow dose titration when starting, or changing doses, of lamotrigine may reduce the incidence of skin rash and other side effects.  The dosage should not be titrated upwards or increased faster than recommended due to the possibility of the discussed side effects and risk of development of a skin rash (which can become life threatening).  This APRN has also discussed that if the patient stops taking the lamotrigine for 5 days or longer, it will be necessary to restart the drug with an initial dose titration, as rashes have been reported on reexposure.  If the patient/guardian and Provider decide to stop the lamotrigine,  the patient/guardian will follow the directions of this APRN/this office as a guided taper over about two weeks is appropriate due to the risk of relapse in bipolar disorder with those with bipolar disorder, the risk of seizures in those with epilepsy, and discontinuation symptoms upon rapid discontinuation of lamotrigine. The patient/guardian verbalizes understanding of benefits and risks as discussed, the patient/guardian feels the benefits outweigh the risks and is agreeable to continue/take lamotrigine as discussed.  The patient/guardian is advised should any side effects or rash develops they are to stop the lamotrigine immediately and contact this APRN/this office or go to the emergency department immediately.  The patient/guardian verbalizes understanding and agreement with treatment plan in their own words.  He reports he has been taking Lamictal 25 mg twice daily.   -I've explained to him that drugs of the SSRI class can have side effects such as weight gain, sexual dysfunction, insomnia, headache, nausea. These medications are generally effective at alleviating symptoms of anxiety and/or depression. Let me know if significant side effects do occur.    -Discussed risks of taking benzodiazapine's with narcotics, including respiratory depression and death, he verbalizes understanding. Instructed to take the least amount of pain medication and benzodiazapine's as possible to manage pain and anxiety.    The patient is being prescribed a controlled substance as part of the treatment plan. The patient has been educated of appropriate use of the medication, including risks and side effects such as somnolence, limited ability to drive and/or work or function safely, potential for dependence, respiratory depression, falls, change in blood pressure, changes in heart rhythm or heart rate, activation of other mental illnesses, and overdose among others. Patient is also informed that the medication is to be used by the  patient only, and to avoid any combined use of ETOH, or other substances, with this medication unless prescribed and as directed by a Provider.  The patient verbalized understanding and agreement with this in their own words.    The patient is on a long-term benzodiazepine therapy which is needed for stable, consistent, and improved quality of life.  We have discussed potential risks and side effects including early onset dementia, addiction with continued use, sedation, fatigue, depression, dizziness, ataxia, slurred speech, weakness, forgetfulness, confusion, hyperexcitability, nervousness, hallucinations, cheri or hypomania, hypotension, hypersalivation, dry mouth, respiratory depression especially when taken with CNS depressants and in overdose, rare hepatic dysfunction, rare renal dysfunction, blood dyscrasias, increased risk of falls, and impaired driving.  The patient is advised to not drive when taking a controlled substance.  The patient is also informed that the medication is to be used by the patient only, it is to be taken only as prescribed/directed, and to avoid any combined use of alcohol/ETOH or other substances unless prescribed and as directed by a Provider.  The patient verbalizes understanding and willingness in their own words to accept the potential side effects and risks for a better quality of life, and is currently in compliance and agreement with the plan of care.  A controlled substance agreement is on file, and the patient is in agreement with the terms of the controlled substance agreement.  The patient is advised that NIKHIL reports will be reviewed periodically, as well as random drug screens will be performed periodically, and as needed.        Visit Diagnoses:    ICD-10-CM ICD-9-CM   1. Generalized anxiety disorder  F41.1 300.02   2. Moderate episode of recurrent major depressive disorder  F33.1 296.32   3. Medication management  Z79.899 V58.69   4. Other insomnia  G47.09 780.52   5.  Insomnia, unspecified type  G47.00 780.52   6. Bipolar affective disorder, currently depressed, moderate  F31.32 296.52         TREATMENT PLAN/GOALS: Continue supportive psychotherapy efforts and medications as indicated. Treatment and medication options discussed during today's visit. Patient acknowledged and verbally consented to continue with current treatment plan and was educated on the importance of compliance with treatment and follow-up appointments.    MEDICATION ISSUES:  Discussed medication options and treatment plan of prescribed medication as well as the risks, benefits, and side effects including potential falls, possible impaired driving and metabolic adversities among others. Patient is agreeable to call the office with any worsening of symptoms or onset of side effects. Patient is agreeable to call 911 or go to the nearest ER should he/she begin having SI/HI.     MEDS ORDERED DURING VISIT:  New Medications Ordered This Visit   Medications   • lamoTRIgine (LaMICtal) 25 MG tablet     Sig: Take 2 tablets by mouth 2 (Two) Times a Day.     Dispense:  120 tablet     Refill:  2   • hydrOXYzine pamoate (VISTARIL) 25 MG capsule     Sig: Take 1 capsule up to 3 times daily for anxiety.     Dispense:  90 capsule     Refill:  2   • escitalopram (Lexapro) 20 MG tablet     Sig: Take 1 tablet by mouth Daily.     Dispense:  30 tablet     Refill:  2   • ALPRAZolam (XANAX) 0.5 MG tablet     Sig: Take 1 tablet by mouth 3 (Three) Times a Day As Needed for Anxiety.     Dispense:  90 tablet     Refill:  0     -Continue Lamictal 25 mg take 2 tablest twice a day for bipolar disorder.  -Continue Lexapro 20 mg tablet, take 1 tablet daily for depression  -Continue hydroxyzine 25 mg capsule take 1 capsule up to 3 times a day for anxiety.  -Continue Xanax 0.5 mg tablet, take 1 tablet 3 times a day, only as needed for anxiety (not sent today).    -Continue psychotherapy.    Return in about 3 months (around 8/24/2023).        Prognosis: Guarded dependent on medication/follow up and treatment plan compliance.  Functionality: pt showing improvements in important areas of daily functioning.     Short-term goals: Patient will adhere to medication regimen and note continued improvement in symptoms over the next 3 months.   Long-term goals: Patient will be adherent to medication management and psychotherapy with continued improvement in symptoms over the next 6 months    I spent 30 minutes caring for Will on this date of service. This time includes time spent by me in the following activities: preparing for the visit, obtaining and/or reviewing a separately obtained history, performing a medically appropriate examination and/or evaluation, counseling and educating the patient/family/caregiver, ordering medications, tests, or procedures and documenting information in the medical record        This document has been electronically signed by ALBERT Cameron   May 24, 2023 11:44 EDT    Part of this note may be an electronic transcription/translation of spoken language to printed text using the Dragon Dictation System.

## 2023-05-24 ENCOUNTER — OFFICE VISIT (OUTPATIENT)
Dept: PSYCHIATRY | Facility: CLINIC | Age: 53
End: 2023-05-24
Payer: MEDICARE

## 2023-05-24 VITALS
WEIGHT: 254.4 LBS | SYSTOLIC BLOOD PRESSURE: 140 MMHG | HEART RATE: 53 BPM | HEIGHT: 70 IN | DIASTOLIC BLOOD PRESSURE: 98 MMHG | BODY MASS INDEX: 36.42 KG/M2

## 2023-05-24 DIAGNOSIS — G47.00 INSOMNIA, UNSPECIFIED TYPE: ICD-10-CM

## 2023-05-24 DIAGNOSIS — F41.1 GENERALIZED ANXIETY DISORDER: Primary | ICD-10-CM

## 2023-05-24 DIAGNOSIS — F33.1 MODERATE EPISODE OF RECURRENT MAJOR DEPRESSIVE DISORDER: ICD-10-CM

## 2023-05-24 DIAGNOSIS — F31.32 BIPOLAR AFFECTIVE DISORDER, CURRENTLY DEPRESSED, MODERATE: ICD-10-CM

## 2023-05-24 DIAGNOSIS — Z79.899 MEDICATION MANAGEMENT: ICD-10-CM

## 2023-05-24 DIAGNOSIS — G47.09 OTHER INSOMNIA: ICD-10-CM

## 2023-05-24 RX ORDER — LAMOTRIGINE 25 MG/1
50 TABLET ORAL 2 TIMES DAILY
Qty: 120 TABLET | Refills: 2 | Status: SHIPPED | OUTPATIENT
Start: 2023-05-24

## 2023-05-24 RX ORDER — ALPRAZOLAM 0.5 MG/1
0.5 TABLET ORAL 3 TIMES DAILY PRN
Qty: 90 TABLET | Refills: 0 | Status: SHIPPED | OUTPATIENT
Start: 2023-05-24

## 2023-05-24 RX ORDER — ESCITALOPRAM OXALATE 20 MG/1
20 TABLET ORAL DAILY
Qty: 30 TABLET | Refills: 2 | Status: SHIPPED | OUTPATIENT
Start: 2023-05-24 | End: 2024-05-23

## 2023-05-24 RX ORDER — HYDROXYZINE PAMOATE 25 MG/1
CAPSULE ORAL
Qty: 90 CAPSULE | Refills: 2 | Status: SHIPPED | OUTPATIENT
Start: 2023-05-24

## 2023-07-31 DIAGNOSIS — F41.1 GENERALIZED ANXIETY DISORDER: ICD-10-CM

## 2023-07-31 RX ORDER — ALPRAZOLAM 0.5 MG/1
0.5 TABLET ORAL 3 TIMES DAILY PRN
Qty: 90 TABLET | Refills: 0 | Status: SHIPPED | OUTPATIENT
Start: 2023-07-31

## 2023-08-17 NOTE — PROGRESS NOTES
Angel Weston is a 52 y.o. male who presents today for follow up    Chief Complaint:  Bipolar disorder, depression, anxiety    History of Present Illness:   History of Present Illness  Today is a follow-up visit.     Medication compliance: patient is compliant with medications, denies SE.  Depression rated 4/10, with 10 being the worst.  Anxiety rated 5/10, with 10 being the worst.  Mood rated 4/10, with 10 being the worst.  Sleep is fair, getting about 5 hours a night,  Nightmares: Denies  Appetite is good.  He is drinking less soda, has lost some weight, can fit into smaller shorts.  Hallucinations: Patient denies auditory hallucinations and visual hallucinations  Self-harm: Patient denies thoughts of self-harm.  Alcohol use: no  Drug use: no  Marijuana use: yes He has cut down marijuana use, to once every 3 nights or so.     Chronic health issues, no acute physical or medical issues today.    Details: things are going ok, he has some issues with high blood pressure last week, it leveled out, he saw PCP LUCI Doll at [facility], was evaluated and no changes were made. He is doing yard work, and stays busy. He states he has continues to try and decrease Xanax, but he has increased anxiety, when he takes less than prescribed.       The following portions of the patient's history were reviewed and updated as appropriate: allergies, current medications, past family history, past medical history, past social history, past surgical history and problem list.      Past Medical History:  History reviewed. No pertinent past medical history.    Social History:  Social History     Socioeconomic History    Marital status: Single   Tobacco Use    Smoking status: Never    Smokeless tobacco: Never   Vaping Use    Vaping Use: Never used       Family History:  History reviewed. No pertinent family history.    Past Surgical History:  History reviewed. No pertinent surgical history.    Problem List:  There is no problem  "list on file for this patient.      Allergy:   No Known Allergies     Current Medications:   Current Outpatient Medications   Medication Sig Dispense Refill    ALPRAZolam (XANAX) 0.5 MG tablet Take 1 tablet by mouth 3 (Three) Times a Day As Needed for Anxiety. 90 tablet 0    amLODIPine (NORVASC) 10 MG tablet       cloNIDine (CATAPRES) 0.1 MG tablet       escitalopram (Lexapro) 20 MG tablet Take 1 tablet by mouth Daily. 30 tablet 2    flecainide (TAMBOCOR) 50 MG tablet       gabapentin (NEURONTIN) 800 MG tablet Take 1 tablet by mouth 2 (Two) Times a Day.      gemfibrozil (LOPID) 600 MG tablet       hydroCHLOROthiazide (HYDRODIURIL) 25 MG tablet       HYDROcodone-acetaminophen (NORCO)  MG per tablet Take 1 tablet by mouth 2 (Two) Times a Day.      hydrOXYzine pamoate (VISTARIL) 25 MG capsule Take 1 capsule up to 3 times daily for anxiety. 90 capsule 2    ibuprofen (ADVIL,MOTRIN) 800 MG tablet       lamoTRIgine (LaMICtal) 25 MG tablet Take 2 tablets by mouth 2 (Two) Times a Day. 120 tablet 2    losartan-hydrochlorothiazide (HYZAAR) 50-12.5 MG per tablet       metoprolol tartrate (LOPRESSOR) 25 MG tablet       omeprazole (priLOSEC) 20 MG capsule Take 1 capsule by mouth Daily.      tiZANidine (ZANAFLEX) 4 MG tablet       vitamin D (ERGOCALCIFEROL) 1.25 MG (53521 UT) capsule capsule        No current facility-administered medications for this visit.       Review of Symptoms:    Review of Systems   Musculoskeletal:  Positive for arthralgias, back pain and neck pain.   Neurological:  Positive for memory problem.   Psychiatric/Behavioral:  Positive for sleep disturbance, depressed mood and stress. Negative for dysphoric mood, hallucinations, self-injury and suicidal ideas. The patient is nervous/anxious.    All other systems reviewed and are negative.    Objective   Physical Exam:   Blood pressure 130/84, pulse 77, height 177.8 cm (70\"), weight 112 kg (247 lb).  Body mass index is 35.44 kg/mý.    8/24/23    MENTAL " STATUS EXAM   General Appearance:  Cleanly groomed and dressed  Eye Contact:  Good eye contact  Attitude:  Cooperative  Motor Activity:  Normal gait, posture  Speech:  Normal rate, tone, volume  Language:  Spontaneous  Mood and affect:  Normal, pleasant  Hopelessness:  Denies  Thought Process:  Logical  Associations/ Thought Content:  No delusions  Hallucinations:  None  Suicidal Ideations:  Not present  Homicidal Ideation:  Not present  Sensorium:  Alert  Orientation:  Person, place, time and situation  Insight:  Fair  Judgement:  Fair  Reliability:  Fair  Impulse Control:  Fair    PHQ-9 Total Score: 5      Lab Results:   No visits with results within 1 Month(s) from this visit.   Latest known visit with results is:   Office Visit on 02/27/2023   Component Date Value Ref Range Status    External Amphetamine Screen Urine 02/27/2023 Negative   Final    External Benzodiazepine Screen Uri* 02/27/2023 Positive (A)   Final    External Cocaine Screen Urine 02/27/2023 Negative   Final    External THC Screen Urine 02/27/2023 Positive (A)   Final    External Methadone Screen Urine 02/27/2023 Negative   Final    External Methamphetamine Screen Ur* 02/27/2023 Negative   Final    External Oxycodone Screen Urine 02/27/2023 Negative   Final    External Buprenorphine Screen Urine 02/27/2023 Negative   Final    External MDMA 02/27/2023 Negative   Final    External Opiates Screen Urine 02/27/2023 Positive (A)   Final       Assessment & Plan   Problems Addressed this Visit    None  Visit Diagnoses       Generalized anxiety disorder    -  Primary    Relevant Medications    lamoTRIgine (LaMICtal) 25 MG tablet    hydrOXYzine pamoate (VISTARIL) 25 MG capsule    escitalopram (Lexapro) 20 MG tablet    ALPRAZolam (XANAX) 0.5 MG tablet    Moderate episode of recurrent major depressive disorder        Relevant Medications    lamoTRIgine (LaMICtal) 25 MG tablet    hydrOXYzine pamoate (VISTARIL) 25 MG capsule    escitalopram (Lexapro) 20 MG  tablet    ALPRAZolam (XANAX) 0.5 MG tablet    Bipolar affective disorder, currently depressed, moderate        Relevant Medications    lamoTRIgine (LaMICtal) 25 MG tablet    hydrOXYzine pamoate (VISTARIL) 25 MG capsule    escitalopram (Lexapro) 20 MG tablet    ALPRAZolam (XANAX) 0.5 MG tablet    Medication management        Relevant Medications    lamoTRIgine (LaMICtal) 25 MG tablet    escitalopram (Lexapro) 20 MG tablet    Other insomnia        Insomnia, unspecified type        Relevant Medications    lamoTRIgine (LaMICtal) 25 MG tablet    hydrOXYzine pamoate (VISTARIL) 25 MG capsule          Diagnoses         Codes Comments    Generalized anxiety disorder    -  Primary ICD-10-CM: F41.1  ICD-9-CM: 300.02     Moderate episode of recurrent major depressive disorder     ICD-10-CM: F33.1  ICD-9-CM: 296.32     Bipolar affective disorder, currently depressed, moderate     ICD-10-CM: F31.32  ICD-9-CM: 296.52     Medication management     ICD-10-CM: Z79.899  ICD-9-CM: V58.69     Other insomnia     ICD-10-CM: G47.09  ICD-9-CM: 780.52     Insomnia, unspecified type     ICD-10-CM: G47.00  ICD-9-CM: 780.52           Social History     Tobacco Use   Smoking Status Never   Smokeless Tobacco Never     NIKHIL reviewed and appropriate. Patient counseled on use of controlled substances.       -The benefits of a healthy diet and exercise were discussed with patient, especially the positive effects they have on mental health. Patient encouraged to consider lifestyle modification regarding  diet and exercise patterns to maximize results of mental health treatment.  -Reviewed previous available documentation  -Reviewed most recent available labs   -This APRN has discussed that a very slow dose titration when starting, or changing doses, of lamotrigine may reduce the incidence of skin rash and other side effects.  The dosage should not be titrated upwards or increased faster than recommended due to the possibility of the discussed side  effects and risk of development of a skin rash (which can become life threatening).  This APRN has also discussed that if the patient stops taking the lamotrigine for 5 days or longer, it will be necessary to restart the drug with an initial dose titration, as rashes have been reported on reexposure.  If the patient/guardian and Provider decide to stop the lamotrigine, the patient/guardian will follow the directions of this APRN/this office as a guided taper over about two weeks is appropriate due to the risk of relapse in bipolar disorder with those with bipolar disorder, the risk of seizures in those with epilepsy, and discontinuation symptoms upon rapid discontinuation of lamotrigine. The patient/guardian verbalizes understanding of benefits and risks as discussed, the patient/guardian feels the benefits outweigh the risks and is agreeable to continue/take lamotrigine as discussed.  The patient/guardian is advised should any side effects or rash develops they are to stop the lamotrigine immediately and contact this APRN/this office or go to the emergency department immediately.  The patient/guardian verbalizes understanding and agreement with treatment plan in their own words.  He reports he has been taking Lamictal 25 mg twice daily.   -I've explained to him that drugs of the SSRI class can have side effects such as weight gain, sexual dysfunction, insomnia, headache, nausea. These medications are generally effective at alleviating symptoms of anxiety and/or depression. Let me know if significant side effects do occur.    -Discussed risks of taking benzodiazapine's with narcotics, including respiratory depression and death, he verbalizes understanding. Instructed to take the least amount of pain medication and benzodiazapine's as possible to manage pain and anxiety.    The patient is being prescribed a controlled substance as part of the treatment plan. The patient has been educated of appropriate use of the  medication, including risks and side effects such as somnolence, limited ability to drive and/or work or function safely, potential for dependence, respiratory depression, falls, change in blood pressure, changes in heart rhythm or heart rate, activation of other mental illnesses, and overdose among others. Patient is also informed that the medication is to be used by the patient only, and to avoid any combined use of ETOH, or other substances, with this medication unless prescribed and as directed by a Provider.  The patient verbalized understanding and agreement with this in their own words.    The patient is on a long-term benzodiazepine therapy which is needed for stable, consistent, and improved quality of life.  We have discussed potential risks and side effects including early onset dementia, addiction with continued use, sedation, fatigue, depression, dizziness, ataxia, slurred speech, weakness, forgetfulness, confusion, hyperexcitability, nervousness, hallucinations, cheri or hypomania, hypotension, hypersalivation, dry mouth, respiratory depression especially when taken with CNS depressants and in overdose, rare hepatic dysfunction, rare renal dysfunction, blood dyscrasias, increased risk of falls, and impaired driving.  The patient is advised to not drive when taking a controlled substance.  The patient is also informed that the medication is to be used by the patient only, it is to be taken only as prescribed/directed, and to avoid any combined use of alcohol/ETOH or other substances unless prescribed and as directed by a Provider.  The patient verbalizes understanding and willingness in their own words to accept the potential side effects and risks for a better quality of life, and is currently in compliance and agreement with the plan of care.  A controlled substance agreement is on file, and the patient is in agreement with the terms of the controlled substance agreement.  The patient is advised that  NIKHIL reports will be reviewed periodically, as well as random drug screens will be performed periodically, and as needed.        Visit Diagnoses:    ICD-10-CM ICD-9-CM   1. Generalized anxiety disorder  F41.1 300.02   2. Moderate episode of recurrent major depressive disorder  F33.1 296.32   3. Bipolar affective disorder, currently depressed, moderate  F31.32 296.52   4. Medication management  Z79.899 V58.69   5. Other insomnia  G47.09 780.52   6. Insomnia, unspecified type  G47.00 780.52         TREATMENT PLAN/GOALS: Continue supportive psychotherapy efforts and medications as indicated. Treatment and medication options discussed during today's visit. Patient acknowledged and verbally consented to continue with current treatment plan and was educated on the importance of compliance with treatment and follow-up appointments.    MEDICATION ISSUES:  Discussed medication options and treatment plan of prescribed medication as well as the risks, benefits, and side effects including potential falls, possible impaired driving and metabolic adversities among others. Patient is agreeable to call the office with any worsening of symptoms or onset of side effects. Patient is agreeable to call 911 or go to the nearest ER should he/she begin having SI/HI.     MEDS ORDERED DURING VISIT:  New Medications Ordered This Visit   Medications    lamoTRIgine (LaMICtal) 25 MG tablet     Sig: Take 2 tablets by mouth 2 (Two) Times a Day.     Dispense:  120 tablet     Refill:  2    hydrOXYzine pamoate (VISTARIL) 25 MG capsule     Sig: Take 1 capsule up to 3 times daily for anxiety.     Dispense:  90 capsule     Refill:  2    escitalopram (Lexapro) 20 MG tablet     Sig: Take 1 tablet by mouth Daily.     Dispense:  30 tablet     Refill:  2    ALPRAZolam (XANAX) 0.5 MG tablet     Sig: Take 1 tablet by mouth 3 (Three) Times a Day As Needed for Anxiety.     Dispense:  90 tablet     Refill:  0     -Continue Lamictal 25 mg take 2 tablets twice a  day for bipolar disorder.  -Continue Lexapro 20 mg tablet, take 1 tablet daily for depression  -Continue hydroxyzine 25 mg capsule take 1 capsule up to 3 times a day for anxiety.  -Continue Xanax 0.5 mg tablet, take 1 tablet 3 times a day, only as needed for anxiety   -Continue psychotherapy.    Return in about 3 months (around 11/24/2023).       Prognosis: Guarded dependent on medication/follow up and treatment plan compliance.  Functionality: pt showing improvements in important areas of daily functioning.     Short-term goals: Patient will adhere to medication regimen and note continued improvement in symptoms over the next 3 months.   Long-term goals: Patient will be adherent to medication management and psychotherapy with continued improvement in symptoms over the next 6 months    I spent 30 minutes caring for Will on this date of service. This time includes time spent by me in the following activities: preparing for the visit, obtaining and/or reviewing a separately obtained history, performing a medically appropriate examination and/or evaluation, counseling and educating the patient/family/caregiver, ordering medications, tests, or procedures, and documenting information in the medical record          This document has been electronically signed by ALBERT Cameron   August 24, 2023 11:05 EDT    Part of this note may be an electronic transcription/translation of spoken language to printed text using the Dragon Dictation System.

## 2023-08-24 ENCOUNTER — OFFICE VISIT (OUTPATIENT)
Dept: PSYCHIATRY | Facility: CLINIC | Age: 53
End: 2023-08-24
Payer: MEDICARE

## 2023-08-24 VITALS
DIASTOLIC BLOOD PRESSURE: 84 MMHG | WEIGHT: 247 LBS | HEART RATE: 77 BPM | SYSTOLIC BLOOD PRESSURE: 130 MMHG | HEIGHT: 70 IN | BODY MASS INDEX: 35.36 KG/M2

## 2023-08-24 DIAGNOSIS — G47.09 OTHER INSOMNIA: ICD-10-CM

## 2023-08-24 DIAGNOSIS — Z79.899 MEDICATION MANAGEMENT: ICD-10-CM

## 2023-08-24 DIAGNOSIS — F33.1 MODERATE EPISODE OF RECURRENT MAJOR DEPRESSIVE DISORDER: ICD-10-CM

## 2023-08-24 DIAGNOSIS — G47.00 INSOMNIA, UNSPECIFIED TYPE: ICD-10-CM

## 2023-08-24 DIAGNOSIS — F41.1 GENERALIZED ANXIETY DISORDER: Primary | ICD-10-CM

## 2023-08-24 DIAGNOSIS — F31.32 BIPOLAR AFFECTIVE DISORDER, CURRENTLY DEPRESSED, MODERATE: ICD-10-CM

## 2023-08-24 RX ORDER — LAMOTRIGINE 25 MG/1
50 TABLET ORAL 2 TIMES DAILY
Qty: 120 TABLET | Refills: 2 | Status: SHIPPED | OUTPATIENT
Start: 2023-08-24

## 2023-08-24 RX ORDER — HYDROXYZINE PAMOATE 25 MG/1
CAPSULE ORAL
Qty: 90 CAPSULE | Refills: 2 | Status: SHIPPED | OUTPATIENT
Start: 2023-08-24

## 2023-08-24 RX ORDER — ALPRAZOLAM 0.5 MG/1
0.5 TABLET ORAL 3 TIMES DAILY PRN
Qty: 90 TABLET | Refills: 0 | Status: SHIPPED | OUTPATIENT
Start: 2023-08-24

## 2023-08-24 RX ORDER — ESCITALOPRAM OXALATE 20 MG/1
20 TABLET ORAL DAILY
Qty: 30 TABLET | Refills: 2 | Status: SHIPPED | OUTPATIENT
Start: 2023-08-24 | End: 2024-08-23

## 2023-09-21 DIAGNOSIS — F41.1 GENERALIZED ANXIETY DISORDER: ICD-10-CM

## 2023-09-21 RX ORDER — ALPRAZOLAM 0.5 MG/1
0.5 TABLET ORAL 3 TIMES DAILY PRN
Qty: 90 TABLET | Refills: 0 | Status: SHIPPED | OUTPATIENT
Start: 2023-09-21

## 2023-10-16 DIAGNOSIS — F41.1 GENERALIZED ANXIETY DISORDER: ICD-10-CM

## 2023-10-16 DIAGNOSIS — Z79.899 MEDICATION MANAGEMENT: ICD-10-CM

## 2023-10-16 DIAGNOSIS — F33.1 MODERATE EPISODE OF RECURRENT MAJOR DEPRESSIVE DISORDER: ICD-10-CM

## 2023-10-16 DIAGNOSIS — G47.00 INSOMNIA, UNSPECIFIED TYPE: ICD-10-CM

## 2023-10-16 DIAGNOSIS — F31.32 BIPOLAR AFFECTIVE DISORDER, CURRENTLY DEPRESSED, MODERATE: ICD-10-CM

## 2023-10-16 RX ORDER — ALPRAZOLAM 0.5 MG/1
0.5 TABLET ORAL 3 TIMES DAILY PRN
Qty: 90 TABLET | Refills: 0 | Status: SHIPPED | OUTPATIENT
Start: 2023-10-16

## 2023-10-16 RX ORDER — HYDROXYZINE PAMOATE 25 MG/1
CAPSULE ORAL
Qty: 90 CAPSULE | Refills: 2 | Status: SHIPPED | OUTPATIENT
Start: 2023-10-16

## 2023-10-16 RX ORDER — ESCITALOPRAM OXALATE 20 MG/1
20 TABLET ORAL DAILY
Qty: 30 TABLET | Refills: 2 | Status: SHIPPED | OUTPATIENT
Start: 2023-10-16 | End: 2024-10-15

## 2023-10-16 RX ORDER — LAMOTRIGINE 25 MG/1
50 TABLET ORAL 2 TIMES DAILY
Qty: 120 TABLET | Refills: 2 | Status: SHIPPED | OUTPATIENT
Start: 2023-10-16

## 2023-11-14 DIAGNOSIS — F41.1 GENERALIZED ANXIETY DISORDER: ICD-10-CM

## 2023-11-15 RX ORDER — ALPRAZOLAM 0.5 MG/1
0.5 TABLET ORAL 3 TIMES DAILY PRN
Qty: 90 TABLET | Refills: 0 | Status: SHIPPED | OUTPATIENT
Start: 2023-11-15

## 2023-11-29 ENCOUNTER — TELEPHONE (OUTPATIENT)
Dept: CARDIOLOGY | Facility: CLINIC | Age: 53
End: 2023-11-29
Payer: MEDICARE

## 2023-11-29 NOTE — TELEPHONE ENCOUNTER
"    Caller: Frankie Weston \"Will jose\"    Relationship: Self    Best call back number: [phone]    Who is your current provider: DR. MARTINEZ    Is your current provider offboarding? NO    Who would you like your new provider to be: DR. THOMSON    What are your reasons for transferring care: DISAGREES WITH MEDICATIONS HE WAS PRESCRIBED AND WANTS DR. THOMSON TO BE HIS CARDIOLOGIST PATIENT ADVISING HE IS SWITCHING INSURANCES AND HE WILL CALL BACK TO ADVISE WHICH AND IF  IN NETWORK    Additional notes: PLEASE CALL THE PATIENT         "

## 2023-11-30 NOTE — TELEPHONE ENCOUNTER
Spoke with patient. I let him know that Dr Freitas does not see patients that have seen local cardiologists but he can call Dr Hudson office to see if they can get him scheduled. He was in agreement with this and I gave him the phone number.

## 2023-12-11 DIAGNOSIS — F41.1 GENERALIZED ANXIETY DISORDER: ICD-10-CM

## 2023-12-11 RX ORDER — ALPRAZOLAM 0.5 MG/1
0.5 TABLET ORAL 3 TIMES DAILY PRN
Qty: 90 TABLET | Refills: 0 | Status: SHIPPED | OUTPATIENT
Start: 2023-12-11

## 2024-01-05 DIAGNOSIS — G47.00 INSOMNIA, UNSPECIFIED TYPE: ICD-10-CM

## 2024-01-05 DIAGNOSIS — F41.1 GENERALIZED ANXIETY DISORDER: ICD-10-CM

## 2024-01-05 DIAGNOSIS — Z79.899 MEDICATION MANAGEMENT: ICD-10-CM

## 2024-01-05 DIAGNOSIS — F31.32 BIPOLAR AFFECTIVE DISORDER, CURRENTLY DEPRESSED, MODERATE: ICD-10-CM

## 2024-01-05 DIAGNOSIS — F33.1 MODERATE EPISODE OF RECURRENT MAJOR DEPRESSIVE DISORDER: ICD-10-CM

## 2024-01-05 RX ORDER — ALPRAZOLAM 0.5 MG/1
0.5 TABLET ORAL 3 TIMES DAILY PRN
Qty: 90 TABLET | Refills: 0 | Status: SHIPPED | OUTPATIENT
Start: 2024-01-05

## 2024-01-05 RX ORDER — ESCITALOPRAM OXALATE 20 MG/1
20 TABLET ORAL DAILY
Qty: 30 TABLET | Refills: 2 | Status: SHIPPED | OUTPATIENT
Start: 2024-01-05 | End: 2025-01-04

## 2024-01-05 RX ORDER — LAMOTRIGINE 25 MG/1
50 TABLET ORAL 2 TIMES DAILY
Qty: 120 TABLET | Refills: 2 | Status: SHIPPED | OUTPATIENT
Start: 2024-01-05

## 2024-01-05 RX ORDER — HYDROXYZINE PAMOATE 25 MG/1
CAPSULE ORAL
Qty: 90 CAPSULE | Refills: 2 | Status: SHIPPED | OUTPATIENT
Start: 2024-01-05

## 2024-02-02 DIAGNOSIS — F41.1 GENERALIZED ANXIETY DISORDER: ICD-10-CM

## 2024-02-02 RX ORDER — ALPRAZOLAM 0.5 MG/1
0.5 TABLET ORAL 3 TIMES DAILY PRN
Qty: 90 TABLET | Refills: 0 | Status: SHIPPED | OUTPATIENT
Start: 2024-02-02

## 2024-02-08 ENCOUNTER — OFFICE VISIT (OUTPATIENT)
Dept: PSYCHIATRY | Facility: CLINIC | Age: 54
End: 2024-02-08
Payer: MEDICARE

## 2024-02-08 VITALS
HEART RATE: 61 BPM | HEIGHT: 70 IN | DIASTOLIC BLOOD PRESSURE: 72 MMHG | WEIGHT: 251.2 LBS | SYSTOLIC BLOOD PRESSURE: 128 MMHG | BODY MASS INDEX: 35.96 KG/M2 | OXYGEN SATURATION: 95 %

## 2024-02-08 DIAGNOSIS — Z79.899 MEDICATION MANAGEMENT: ICD-10-CM

## 2024-02-08 DIAGNOSIS — F31.32 BIPOLAR AFFECTIVE DISORDER, CURRENTLY DEPRESSED, MODERATE: ICD-10-CM

## 2024-02-08 DIAGNOSIS — F41.1 GENERALIZED ANXIETY DISORDER: Primary | ICD-10-CM

## 2024-02-08 DIAGNOSIS — G47.00 INSOMNIA, UNSPECIFIED TYPE: ICD-10-CM

## 2024-02-08 DIAGNOSIS — F33.1 MODERATE EPISODE OF RECURRENT MAJOR DEPRESSIVE DISORDER: ICD-10-CM

## 2024-02-08 DIAGNOSIS — G47.09 OTHER INSOMNIA: ICD-10-CM

## 2024-02-08 LAB
EXTERNAL AMPHETAMINE SCREEN URINE: NEGATIVE
EXTERNAL BENZODIAZEPINE SCREEN URINE: NEGATIVE
EXTERNAL BUPRENORPHINE SCREEN URINE: NEGATIVE
EXTERNAL COCAINE SCREEN URINE: NEGATIVE
EXTERNAL MDMA: NEGATIVE
EXTERNAL METHADONE SCREEN URINE: NEGATIVE
EXTERNAL METHAMPHETAMINE SCREEN URINE: NEGATIVE
EXTERNAL OPIATES SCREEN URINE: NEGATIVE
EXTERNAL OXYCODONE SCREEN URINE: NEGATIVE
EXTERNAL THC SCREEN URINE: POSITIVE

## 2024-02-08 RX ORDER — ESCITALOPRAM OXALATE 20 MG/1
20 TABLET ORAL DAILY
Qty: 30 TABLET | Refills: 2 | Status: SHIPPED | OUTPATIENT
Start: 2024-02-08 | End: 2025-02-07

## 2024-02-08 RX ORDER — LAMOTRIGINE 25 MG/1
50 TABLET ORAL 2 TIMES DAILY
Qty: 120 TABLET | Refills: 2 | Status: SHIPPED | OUTPATIENT
Start: 2024-02-08

## 2024-02-08 RX ORDER — HYDROXYZINE PAMOATE 25 MG/1
CAPSULE ORAL
Qty: 90 CAPSULE | Refills: 2 | Status: SHIPPED | OUTPATIENT
Start: 2024-02-08

## 2024-02-08 NOTE — PROGRESS NOTES
Angel Weston is a 53 y.o. male who presents today for follow up    Chief Complaint:  Bipolar disorder, depression, anxiety    History of Present Illness:   History of Present Illness  Today is a follow-up visit.     Medication compliance: patient is compliant with medications, denies SE.  Depression rated 6/10, with 10 being the worst.  Anxiety rated 7/10, with 10 being the worst.  Mood rated 6/10, with 10 being the worst.  Sleep is fair, getting about 5 hours a night,  Nightmares: Denies. He states he has neck, shoulder and knee pain, it wakes him often.  Appetite is good.  Hallucinations: Patient denies auditory hallucinations and visual hallucinations  Self-harm: Patient denies thoughts of self-harm.  Alcohol use: no  Drug use: no  Marijuana use: yes, smokes marijuana occasionally at night.     Chronic health issues, no acute physical or medical issues today.    Details: He states he had injury to his knee, and he has had injections for his left shoulder, he may have to have surgery on his shoulder.       The following portions of the patient's history were reviewed and updated as appropriate: allergies, current medications, past family history, past medical history, past social history, past surgical history and problem list.      Past Medical History:  History reviewed. No pertinent past medical history.    Social History:  Social History     Socioeconomic History    Marital status: Single   Tobacco Use    Smoking status: Never    Smokeless tobacco: Never   Vaping Use    Vaping Use: Never used       Family History:  History reviewed. No pertinent family history.    Past Surgical History:  History reviewed. No pertinent surgical history.    Problem List:  There is no problem list on file for this patient.      Allergy:   No Known Allergies     Current Medications:   Current Outpatient Medications   Medication Sig Dispense Refill    ALPRAZolam (XANAX) 0.5 MG tablet Take 1 tablet by mouth 3 (Three)  "Times a Day As Needed for Anxiety. 90 tablet 0    amLODIPine (NORVASC) 10 MG tablet       cloNIDine (CATAPRES) 0.1 MG tablet       escitalopram (Lexapro) 20 MG tablet Take 1 tablet by mouth Daily. 30 tablet 2    flecainide (TAMBOCOR) 50 MG tablet       gabapentin (NEURONTIN) 800 MG tablet Take 1 tablet by mouth 2 (Two) Times a Day.      gemfibrozil (LOPID) 600 MG tablet       hydroCHLOROthiazide (HYDRODIURIL) 25 MG tablet       HYDROcodone-acetaminophen (NORCO)  MG per tablet Take 1 tablet by mouth 2 (Two) Times a Day.      hydrOXYzine pamoate (VISTARIL) 25 MG capsule Take 1 capsule up to 3 times daily for anxiety. 90 capsule 2    ibuprofen (ADVIL,MOTRIN) 800 MG tablet       lamoTRIgine (LaMICtal) 25 MG tablet Take 2 tablets by mouth 2 (Two) Times a Day. 120 tablet 2    losartan-hydrochlorothiazide (HYZAAR) 50-12.5 MG per tablet       metoprolol tartrate (LOPRESSOR) 25 MG tablet       omeprazole (priLOSEC) 20 MG capsule Take 1 capsule by mouth Daily.      tiZANidine (ZANAFLEX) 4 MG tablet       vitamin D (ERGOCALCIFEROL) 1.25 MG (45846 UT) capsule capsule        No current facility-administered medications for this visit.       Review of Symptoms:    Review of Systems   Musculoskeletal:  Positive for arthralgias, back pain and neck pain.   Neurological:  Positive for memory problem.   Psychiatric/Behavioral:  Positive for sleep disturbance, depressed mood and stress. Negative for dysphoric mood, hallucinations, self-injury and suicidal ideas. The patient is nervous/anxious.    All other systems reviewed and are negative.      Objective   Physical Exam:   Blood pressure 128/72, pulse 61, height 177.8 cm (70\"), weight 114 kg (251 lb 3.2 oz), SpO2 95%.  Body mass index is 36.04 kg/m².    2/8/24    MENTAL STATUS EXAM   General Appearance:  Cleanly groomed and dressed  Eye Contact:  Good eye contact  Attitude:  Cooperative  Motor Activity:  Normal gait, posture  Speech:  Normal rate, tone, volume  Language:  " Spontaneous  Mood and affect:  Normal, pleasant  Hopelessness:  Denies  Thought Process:  Logical  Associations/ Thought Content:  No delusions  Hallucinations:  None  Suicidal Ideations:  Not present  Homicidal Ideation:  Not present  Sensorium:  Alert  Orientation:  Person, place, time and situation  Insight:  Fair  Judgement:  Fair  Reliability:  Fair  Impulse Control:  Fair      PHQ-9 Total Score: 10      Lab Results:   Office Visit on 02/08/2024   Component Date Value Ref Range Status    External Amphetamine Screen Urine 02/08/2024 Negative   Final    External Benzodiazepine Screen Uri* 02/08/2024 Negative   Final    External Cocaine Screen Urine 02/08/2024 Negative   Final    External THC Screen Urine 02/08/2024 Positive (A)   Final    External Methadone Screen Urine 02/08/2024 Negative   Final    External Methamphetamine Screen Ur* 02/08/2024 Negative   Final    External Oxycodone Screen Urine 02/08/2024 Negative   Final    External Buprenorphine Screen Urine 02/08/2024 Negative   Final    External MDMA 02/08/2024 Negative   Final    External Opiates Screen Urine 02/08/2024 Negative   Final       Assessment & Plan   Problems Addressed this Visit    None  Visit Diagnoses       Generalized anxiety disorder    -  Primary    Relevant Medications    lamoTRIgine (LaMICtal) 25 MG tablet    hydrOXYzine pamoate (VISTARIL) 25 MG capsule    escitalopram (Lexapro) 20 MG tablet    Moderate episode of recurrent major depressive disorder        Relevant Medications    lamoTRIgine (LaMICtal) 25 MG tablet    hydrOXYzine pamoate (VISTARIL) 25 MG capsule    escitalopram (Lexapro) 20 MG tablet    Other insomnia        Bipolar affective disorder, currently depressed, moderate        Relevant Medications    lamoTRIgine (LaMICtal) 25 MG tablet    hydrOXYzine pamoate (VISTARIL) 25 MG capsule    escitalopram (Lexapro) 20 MG tablet    Medication management        Relevant Medications    lamoTRIgine (LaMICtal) 25 MG tablet     escitalopram (Lexapro) 20 MG tablet    Other Relevant Orders    [facility] Screen (Completed)    Insomnia, unspecified type        Relevant Medications    lamoTRIgine (LaMICtal) 25 MG tablet    hydrOXYzine pamoate (VISTARIL) 25 MG capsule          Diagnoses         Codes Comments    Generalized anxiety disorder    -  Primary ICD-10-CM: F41.1  ICD-9-CM: 300.02     Moderate episode of recurrent major depressive disorder     ICD-10-CM: F33.1  ICD-9-CM: 296.32     Other insomnia     ICD-10-CM: G47.09  ICD-9-CM: 780.52     Bipolar affective disorder, currently depressed, moderate     ICD-10-CM: F31.32  ICD-9-CM: 296.52     Medication management     ICD-10-CM: Z79.899  ICD-9-CM: V58.69     Insomnia, unspecified type     ICD-10-CM: G47.00  ICD-9-CM: 780.52           Social History     Tobacco Use   Smoking Status Never   Smokeless Tobacco Never     NIKHIL reviewed and appropriate. Patient counseled on use of controlled substances.       -The benefits of a healthy diet and exercise were discussed with patient, especially the positive effects they have on mental health. Patient encouraged to consider lifestyle modification regarding  diet and exercise patterns to maximize results of mental health treatment.  -Reviewed previous available documentation  -Reviewed most recent available labs   -This APRN has discussed that a very slow dose titration when starting, or changing doses, of lamotrigine may reduce the incidence of skin rash and other side effects.  The dosage should not be titrated upwards or increased faster than recommended due to the possibility of the discussed side effects and risk of development of a skin rash (which can become life threatening).  This APRN has also discussed that if the patient stops taking the lamotrigine for 5 days or longer, it will be necessary to restart the drug with an initial dose titration, as rashes have been reported on reexposure.  If the patient/guardian and Provider decide to  stop the lamotrigine, the patient/guardian will follow the directions of this APRN/this office as a guided taper over about two weeks is appropriate due to the risk of relapse in bipolar disorder with those with bipolar disorder, the risk of seizures in those with epilepsy, and discontinuation symptoms upon rapid discontinuation of lamotrigine. The patient/guardian verbalizes understanding of benefits and risks as discussed, the patient/guardian feels the benefits outweigh the risks and is agreeable to continue/take lamotrigine as discussed.  The patient/guardian is advised should any side effects or rash develops they are to stop the lamotrigine immediately and contact this APRN/this office or go to the emergency department immediately.  The patient/guardian verbalizes understanding and agreement with treatment plan in their own words.  He reports he has been taking Lamictal 25 mg twice daily.   -I've explained to him that drugs of the SSRI class can have side effects such as weight gain, sexual dysfunction, insomnia, headache, nausea. These medications are generally effective at alleviating symptoms of anxiety and/or depression. Let me know if significant side effects do occur.    -Discussed risks of taking benzodiazapine's with narcotics, including respiratory depression and death, he verbalizes understanding. Instructed to take the least amount of pain medication and benzodiazapine's as possible to manage pain and anxiety.    The patient is being prescribed a controlled substance as part of the treatment plan. The patient has been educated of appropriate use of the medication, including risks and side effects such as somnolence, limited ability to drive and/or work or function safely, potential for dependence, respiratory depression, falls, change in blood pressure, changes in heart rhythm or heart rate, activation of other mental illnesses, and overdose among others. Patient is also informed that the medication  is to be used by the patient only, and to avoid any combined use of ETOH, or other substances, with this medication unless prescribed and as directed by a Provider.  The patient verbalized understanding and agreement with this in their own words.    The patient is on a long-term benzodiazepine therapy which is needed for stable, consistent, and improved quality of life.  We have discussed potential risks and side effects including early onset dementia, addiction with continued use, sedation, fatigue, depression, dizziness, ataxia, slurred speech, weakness, forgetfulness, confusion, hyperexcitability, nervousness, hallucinations, cheri or hypomania, hypotension, hypersalivation, dry mouth, respiratory depression especially when taken with CNS depressants and in overdose, rare hepatic dysfunction, rare renal dysfunction, blood dyscrasias, increased risk of falls, and impaired driving.  The patient is advised to not drive when taking a controlled substance.  The patient is also informed that the medication is to be used by the patient only, it is to be taken only as prescribed/directed, and to avoid any combined use of alcohol/ETOH or other substances unless prescribed and as directed by a Provider.  The patient verbalizes understanding and willingness in their own words to accept the potential side effects and risks for a better quality of life, and is currently in compliance and agreement with the plan of care.  A controlled substance agreement is on file, and the patient is in agreement with the terms of the controlled substance agreement.  The patient is advised that NIKHIL reports will be reviewed periodically, as well as random drug screens will be performed periodically, and as needed.        Visit Diagnoses:    ICD-10-CM ICD-9-CM   1. Generalized anxiety disorder  F41.1 300.02   2. Moderate episode of recurrent major depressive disorder  F33.1 296.32   3. Other insomnia  G47.09 780.52   4. Bipolar affective  disorder, currently depressed, moderate  F31.32 296.52   5. Medication management  Z79.899 V58.69   6. Insomnia, unspecified type  G47.00 780.52         TREATMENT PLAN/GOALS: Continue supportive psychotherapy efforts and medications as indicated. Treatment and medication options discussed during today's visit. Patient acknowledged and verbally consented to continue with current treatment plan and was educated on the importance of compliance with treatment and follow-up appointments.    MEDICATION ISSUES:  Discussed medication options and treatment plan of prescribed medication as well as the risks, benefits, and side effects including potential falls, possible impaired driving and metabolic adversities among others. Patient is agreeable to call the office with any worsening of symptoms or onset of side effects. Patient is agreeable to call 911 or go to the nearest ER should he/she begin having SI/HI.     MEDS ORDERED DURING VISIT:  New Medications Ordered This Visit   Medications    lamoTRIgine (LaMICtal) 25 MG tablet     Sig: Take 2 tablets by mouth 2 (Two) Times a Day.     Dispense:  120 tablet     Refill:  2    hydrOXYzine pamoate (VISTARIL) 25 MG capsule     Sig: Take 1 capsule up to 3 times daily for anxiety.     Dispense:  90 capsule     Refill:  2    escitalopram (Lexapro) 20 MG tablet     Sig: Take 1 tablet by mouth Daily.     Dispense:  30 tablet     Refill:  2     -Continue Lamictal 25 mg take 2 tablets twice a day for bipolar disorder.  -Continue Lexapro 20 mg tablet, take 1 tablet daily for depression  -Continue hydroxyzine 25 mg capsule take 1 capsule up to 3 times a day for anxiety.  -Continue Xanax 0.5 mg tablet, take 1 tablet 3 times a day, only as needed for anxiety (sent on 2/5/24)  -Continue psychotherapy.    Return in about 3 months (around 5/8/2024).       Prognosis: Guarded dependent on medication/follow up and treatment plan compliance.  Functionality: pt showing improvements in important  areas of daily functioning.     Short-term goals: Patient will adhere to medication regimen and note continued improvement in symptoms over the next 3 months.   Long-term goals: Patient will be adherent to medication management and psychotherapy with continued improvement in symptoms over the next 6 months    I spent 30 minutes caring for Will on this date of service. This time includes time spent by me in the following activities: preparing for the visit, obtaining and/or reviewing a separately obtained history, performing a medically appropriate examination and/or evaluation, counseling and educating the patient/family/caregiver, ordering medications, tests, or procedures, and documenting information in the medical record            This document has been electronically signed by ALBERT Cameron   February 8, 2024 14:19 EST    Part of this note may be an electronic transcription/translation of spoken language to printed text using the Dragon Dictation System.

## 2024-02-29 DIAGNOSIS — F41.1 GENERALIZED ANXIETY DISORDER: ICD-10-CM

## 2024-02-29 RX ORDER — ALPRAZOLAM 0.5 MG/1
0.5 TABLET ORAL 3 TIMES DAILY PRN
Qty: 90 TABLET | Refills: 0 | Status: SHIPPED | OUTPATIENT
Start: 2024-02-29

## 2024-03-19 ENCOUNTER — TELEPHONE (OUTPATIENT)
Dept: PSYCHIATRY | Facility: CLINIC | Age: 54
End: 2024-03-19
Payer: MEDICARE

## 2024-03-28 DIAGNOSIS — F41.1 GENERALIZED ANXIETY DISORDER: ICD-10-CM

## 2024-03-28 RX ORDER — ALPRAZOLAM 0.5 MG/1
0.5 TABLET ORAL 3 TIMES DAILY PRN
Qty: 90 TABLET | Refills: 0 | Status: SHIPPED | OUTPATIENT
Start: 2024-03-28

## 2024-04-25 DIAGNOSIS — F41.1 GENERALIZED ANXIETY DISORDER: ICD-10-CM

## 2024-04-25 RX ORDER — ALPRAZOLAM 0.5 MG/1
0.5 TABLET ORAL 3 TIMES DAILY PRN
Qty: 90 TABLET | Refills: 0 | Status: SHIPPED | OUTPATIENT
Start: 2024-04-25

## 2024-05-09 NOTE — PROGRESS NOTES
Angel Weston is a 53 y.o. male who presents today for follow up    Chief Complaint:  Bipolar disorder, depression, anxiety    History of Present Illness:   History of Present Illness  Today is a follow-up visit.     Medication compliance: patient is compliant with medications, denies SE.  Depression rated 6/10, with 10 being the worst.  Anxiety rated 7/10, with 10 being the worst.  Mood rated 5/10, with 10 being the worst.  Sleep is good, getting about 6 hours a night,  Nightmares: Denies, pain wakes him often.  Appetite is good.  Hallucinations: Patient denies auditory hallucinations and visual hallucinations  Self-harm: Patient denies thoughts of self-harm.  Alcohol use: no  Drug use: no  Marijuana use: yes, smokes less often     Chronic health issues, no acute physical or medical issues today.    Details: He has had some depression, he has been alone, he is trying to get out more. He has been mowing yards a lot.          The following portions of the patient's history were reviewed and updated as appropriate: allergies, current medications, past family history, past medical history, past social history, past surgical history and problem list.      Past Medical History:  History reviewed. No pertinent past medical history.    Social History:  Social History     Socioeconomic History    Marital status: Single   Tobacco Use    Smoking status: Never    Smokeless tobacco: Never   Vaping Use    Vaping status: Never Used       Family History:  History reviewed. No pertinent family history.    Past Surgical History:  History reviewed. No pertinent surgical history.    Problem List:  There is no problem list on file for this patient.      Allergy:   No Known Allergies     Current Medications:   Current Outpatient Medications   Medication Sig Dispense Refill    ALPRAZolam (XANAX) 0.5 MG tablet Take 1 tablet by mouth 3 (Three) Times a Day As Needed for Anxiety. 90 tablet 0    amLODIPine (NORVASC) 10 MG tablet     "   cloNIDine (CATAPRES) 0.1 MG tablet       escitalopram (Lexapro) 20 MG tablet Take 1 tablet by mouth Daily. 30 tablet 2    gabapentin (NEURONTIN) 800 MG tablet Take 1 tablet by mouth 2 (Two) Times a Day.      gemfibrozil (LOPID) 600 MG tablet       hydroCHLOROthiazide (HYDRODIURIL) 25 MG tablet       HYDROcodone-acetaminophen (NORCO)  MG per tablet Take 1 tablet by mouth 2 (Two) Times a Day.      hydrOXYzine pamoate (VISTARIL) 25 MG capsule Take 1 capsule up to 3 times daily for anxiety. 90 capsule 2    ibuprofen (ADVIL,MOTRIN) 800 MG tablet       lamoTRIgine (LaMICtal) 150 MG tablet Take 1 tablet by mouth Daily. 30 tablet 2    losartan-hydrochlorothiazide (HYZAAR) 50-12.5 MG per tablet       metoprolol tartrate (LOPRESSOR) 25 MG tablet       omeprazole (priLOSEC) 20 MG capsule Take 1 capsule by mouth Daily.      tiZANidine (ZANAFLEX) 4 MG tablet       vitamin D (ERGOCALCIFEROL) 1.25 MG (11945 UT) capsule capsule       flecainide (TAMBOCOR) 50 MG tablet  (Patient not taking: Reported on 5/15/2024)       No current facility-administered medications for this visit.       Review of Symptoms:    Review of Systems   Musculoskeletal:  Positive for arthralgias, back pain and neck pain.   Neurological:  Positive for memory problem.   Psychiatric/Behavioral:  Positive for sleep disturbance, depressed mood and stress. Negative for dysphoric mood, hallucinations, self-injury and suicidal ideas. The patient is nervous/anxious.    All other systems reviewed and are negative.      Objective   Physical Exam:   Blood pressure 122/84, pulse 58, height 177.8 cm (70\"), weight 115 kg (254 lb 3.2 oz), SpO2 97%.  Body mass index is 36.47 kg/m².    5/15/24    MENTAL STATUS EXAM   General Appearance:  Cleanly groomed and dressed  Eye Contact:  Good eye contact  Attitude:  Cooperative  Motor Activity:  Normal gait, posture  Speech:  Normal rate, tone, volume  Language:  Spontaneous  Mood and affect:  Normal, pleasant  Hopelessness:  " Denies  Thought Process:  Logical  Associations/ Thought Content:  No delusions  Hallucinations:  None  Suicidal Ideations:  Not present  Homicidal Ideation:  Not present  Sensorium:  Alert  Orientation:  Person, place, time and situation  Insight:  Fair  Judgement:  Fair  Reliability:  Fair  Impulse Control:  Fair      PHQ-9 Total Score: 7      Lab Results:   No visits with results within 1 Month(s) from this visit.   Latest known visit with results is:   Office Visit on 02/08/2024   Component Date Value Ref Range Status    External Amphetamine Screen Urine 02/08/2024 Negative   Final    External Benzodiazepine Screen Uri* 02/08/2024 Negative   Final    External Cocaine Screen Urine 02/08/2024 Negative   Final    External THC Screen Urine 02/08/2024 Positive (A)   Final    External Methadone Screen Urine 02/08/2024 Negative   Final    External Methamphetamine Screen Ur* 02/08/2024 Negative   Final    External Oxycodone Screen Urine 02/08/2024 Negative   Final    External Buprenorphine Screen Urine 02/08/2024 Negative   Final    External MDMA 02/08/2024 Negative   Final    External Opiates Screen Urine 02/08/2024 Negative   Final       Assessment & Plan   Problems Addressed this Visit    None  Visit Diagnoses       Generalized anxiety disorder    -  Primary    Relevant Medications    lamoTRIgine (LaMICtal) 150 MG tablet    hydrOXYzine pamoate (VISTARIL) 25 MG capsule    escitalopram (Lexapro) 20 MG tablet    Moderate episode of recurrent major depressive disorder        Relevant Medications    lamoTRIgine (LaMICtal) 150 MG tablet    hydrOXYzine pamoate (VISTARIL) 25 MG capsule    escitalopram (Lexapro) 20 MG tablet    Other insomnia        Bipolar affective disorder, currently depressed, moderate        Relevant Medications    lamoTRIgine (LaMICtal) 150 MG tablet    hydrOXYzine pamoate (VISTARIL) 25 MG capsule    escitalopram (Lexapro) 20 MG tablet    Medication management        Relevant Medications    lamoTRIgine  (LaMICtal) 150 MG tablet    escitalopram (Lexapro) 20 MG tablet    Insomnia, unspecified type        Relevant Medications    lamoTRIgine (LaMICtal) 150 MG tablet    hydrOXYzine pamoate (VISTARIL) 25 MG capsule          Diagnoses         Codes Comments    Generalized anxiety disorder    -  Primary ICD-10-CM: F41.1  ICD-9-CM: 300.02     Moderate episode of recurrent major depressive disorder     ICD-10-CM: F33.1  ICD-9-CM: 296.32     Other insomnia     ICD-10-CM: G47.09  ICD-9-CM: 780.52     Bipolar affective disorder, currently depressed, moderate     ICD-10-CM: F31.32  ICD-9-CM: 296.52     Medication management     ICD-10-CM: Z79.899  ICD-9-CM: V58.69     Insomnia, unspecified type     ICD-10-CM: G47.00  ICD-9-CM: 780.52           Social History     Tobacco Use   Smoking Status Never   Smokeless Tobacco Never     NIKHIL reviewed and appropriate. Patient counseled on use of controlled substances.       -The benefits of a healthy diet and exercise were discussed with patient, especially the positive effects they have on mental health. Patient encouraged to consider lifestyle modification regarding  diet and exercise patterns to maximize results of mental health treatment.  -Reviewed previous available documentation  -Reviewed most recent available labs   -This APRN has discussed that a very slow dose titration when starting, or changing doses, of lamotrigine may reduce the incidence of skin rash and other side effects.  The dosage should not be titrated upwards or increased faster than recommended due to the possibility of the discussed side effects and risk of development of a skin rash (which can become life threatening).  This APRN has also discussed that if the patient stops taking the lamotrigine for 5 days or longer, it will be necessary to restart the drug with an initial dose titration, as rashes have been reported on reexposure.  If the patient/guardian and Provider decide to stop the lamotrigine, the  patient/guardian will follow the directions of this APRN/this office as a guided taper over about two weeks is appropriate due to the risk of relapse in bipolar disorder with those with bipolar disorder, the risk of seizures in those with epilepsy, and discontinuation symptoms upon rapid discontinuation of lamotrigine. The patient/guardian verbalizes understanding of benefits and risks as discussed, the patient/guardian feels the benefits outweigh the risks and is agreeable to continue/take lamotrigine as discussed.  The patient/guardian is advised should any side effects or rash develops they are to stop the lamotrigine immediately and contact this APRN/this office or go to the emergency department immediately.  The patient/guardian verbalizes understanding and agreement with treatment plan in their own words.  He reports he has been taking Lamictal 25 mg twice daily.   -I've explained to him that drugs of the SSRI class can have side effects such as weight gain, sexual dysfunction, insomnia, headache, nausea. These medications are generally effective at alleviating symptoms of anxiety and/or depression. Let me know if significant side effects do occur.    -Discussed risks of taking benzodiazapine's with narcotics, including respiratory depression and death, he verbalizes understanding. Instructed to take the least amount of pain medication and benzodiazapine's as possible to manage pain and anxiety.    The patient is being prescribed a controlled substance as part of the treatment plan. The patient has been educated of appropriate use of the medication, including risks and side effects such as somnolence, limited ability to drive and/or work or function safely, potential for dependence, respiratory depression, falls, change in blood pressure, changes in heart rhythm or heart rate, activation of other mental illnesses, and overdose among others. Patient is also informed that the medication is to be used by the  patient only, and to avoid any combined use of ETOH, or other substances, with this medication unless prescribed and as directed by a Provider.  The patient verbalized understanding and agreement with this in their own words.    The patient is on a long-term benzodiazepine therapy which is needed for stable, consistent, and improved quality of life.  We have discussed potential risks and side effects including early onset dementia, addiction with continued use, sedation, fatigue, depression, dizziness, ataxia, slurred speech, weakness, forgetfulness, confusion, hyperexcitability, nervousness, hallucinations, cheri or hypomania, hypotension, hypersalivation, dry mouth, respiratory depression especially when taken with CNS depressants and in overdose, rare hepatic dysfunction, rare renal dysfunction, blood dyscrasias, increased risk of falls, and impaired driving.  The patient is advised to not drive when taking a controlled substance.  The patient is also informed that the medication is to be used by the patient only, it is to be taken only as prescribed/directed, and to avoid any combined use of alcohol/ETOH or other substances unless prescribed and as directed by a Provider.  The patient verbalizes understanding and willingness in their own words to accept the potential side effects and risks for a better quality of life, and is currently in compliance and agreement with the plan of care.  A controlled substance agreement is on file, and the patient is in agreement with the terms of the controlled substance agreement.  The patient is advised that NIKHIL reports will be reviewed periodically, as well as random drug screens will be performed periodically, and as needed.        Visit Diagnoses:    ICD-10-CM ICD-9-CM   1. Generalized anxiety disorder  F41.1 300.02   2. Moderate episode of recurrent major depressive disorder  F33.1 296.32   3. Other insomnia  G47.09 780.52   4. Bipolar affective disorder, currently  depressed, moderate  F31.32 296.52   5. Medication management  Z79.899 V58.69   6. Insomnia, unspecified type  G47.00 780.52             TREATMENT PLAN/GOALS: Continue supportive psychotherapy efforts and medications as indicated. Treatment and medication options discussed during today's visit. Patient acknowledged and verbally consented to continue with current treatment plan and was educated on the importance of compliance with treatment and follow-up appointments.    MEDICATION ISSUES:  Discussed medication options and treatment plan of prescribed medication as well as the risks, benefits, and side effects including potential falls, possible impaired driving and metabolic adversities among others. Patient is agreeable to call the office with any worsening of symptoms or onset of side effects. Patient is agreeable to call 911 or go to the nearest ER should he/she begin having SI/HI.     MEDS ORDERED DURING VISIT:  New Medications Ordered This Visit   Medications    lamoTRIgine (LaMICtal) 150 MG tablet     Sig: Take 1 tablet by mouth Daily.     Dispense:  30 tablet     Refill:  2    hydrOXYzine pamoate (VISTARIL) 25 MG capsule     Sig: Take 1 capsule up to 3 times daily for anxiety.     Dispense:  90 capsule     Refill:  2    escitalopram (Lexapro) 20 MG tablet     Sig: Take 1 tablet by mouth Daily.     Dispense:  30 tablet     Refill:  2     -Increase Lamictal 150 mg take 1 tablet daily for bipolar disorder.  -Continue Lexapro 20 mg tablet, take 1 tablet daily for depression  -Continue hydroxyzine 25 mg capsule take 1 capsule up to 3 times a day for anxiety.  -Continue Xanax 0.5 mg tablet, take 1 tablet 3 times a day, only as needed for anxiety (not sent today)  -Continue psychotherapy.    Return in about 3 months (around 8/15/2024).       Prognosis: Guarded dependent on medication/follow up and treatment plan compliance.  Functionality: pt showing improvements in important areas of daily functioning.      Short-term goals: Patient will adhere to medication regimen and note continued improvement in symptoms over the next 3 months.   Long-term goals: Patient will be adherent to medication management and psychotherapy with continued improvement in symptoms over the next 6 months    I spent 30 minutes caring for Will on this date of service. This time includes time spent by me in the following activities: preparing for the visit, obtaining and/or reviewing a separately obtained history, performing a medically appropriate examination and/or evaluation, counseling and educating the patient/family/caregiver, ordering medications, tests, or procedures, and documenting information in the medical record              This document has been electronically signed by ALBERT Cameron   May 15, 2024 13:41 EDT    Part of this note may be an electronic transcription/translation of spoken language to printed text using the Dragon Dictation System.

## 2024-05-15 ENCOUNTER — OFFICE VISIT (OUTPATIENT)
Dept: PSYCHIATRY | Facility: CLINIC | Age: 54
End: 2024-05-15
Payer: MEDICARE

## 2024-05-15 VITALS
WEIGHT: 254.2 LBS | DIASTOLIC BLOOD PRESSURE: 84 MMHG | BODY MASS INDEX: 36.39 KG/M2 | HEART RATE: 58 BPM | OXYGEN SATURATION: 97 % | HEIGHT: 70 IN | SYSTOLIC BLOOD PRESSURE: 122 MMHG

## 2024-05-15 DIAGNOSIS — F41.1 GENERALIZED ANXIETY DISORDER: Primary | ICD-10-CM

## 2024-05-15 DIAGNOSIS — G47.00 INSOMNIA, UNSPECIFIED TYPE: ICD-10-CM

## 2024-05-15 DIAGNOSIS — F31.32 BIPOLAR AFFECTIVE DISORDER, CURRENTLY DEPRESSED, MODERATE: ICD-10-CM

## 2024-05-15 DIAGNOSIS — G47.09 OTHER INSOMNIA: ICD-10-CM

## 2024-05-15 DIAGNOSIS — F33.1 MODERATE EPISODE OF RECURRENT MAJOR DEPRESSIVE DISORDER: ICD-10-CM

## 2024-05-15 DIAGNOSIS — Z79.899 MEDICATION MANAGEMENT: ICD-10-CM

## 2024-05-15 RX ORDER — ESCITALOPRAM OXALATE 20 MG/1
20 TABLET ORAL DAILY
Qty: 30 TABLET | Refills: 2 | Status: SHIPPED | OUTPATIENT
Start: 2024-05-15 | End: 2025-05-15

## 2024-05-15 RX ORDER — HYDROXYZINE PAMOATE 25 MG/1
CAPSULE ORAL
Qty: 90 CAPSULE | Refills: 2 | Status: SHIPPED | OUTPATIENT
Start: 2024-05-15

## 2024-05-15 RX ORDER — LAMOTRIGINE 150 MG/1
150 TABLET ORAL DAILY
Qty: 30 TABLET | Refills: 2 | Status: SHIPPED | OUTPATIENT
Start: 2024-05-15

## 2024-05-23 DIAGNOSIS — F41.1 GENERALIZED ANXIETY DISORDER: ICD-10-CM

## 2024-05-23 RX ORDER — ALPRAZOLAM 0.5 MG/1
0.5 TABLET ORAL 3 TIMES DAILY PRN
Qty: 90 TABLET | Refills: 0 | Status: SHIPPED | OUTPATIENT
Start: 2024-05-23

## 2024-06-18 DIAGNOSIS — F41.1 GENERALIZED ANXIETY DISORDER: ICD-10-CM

## 2024-06-19 RX ORDER — ALPRAZOLAM 0.5 MG/1
0.5 TABLET ORAL 3 TIMES DAILY PRN
Qty: 90 TABLET | Refills: 0 | Status: SHIPPED | OUTPATIENT
Start: 2024-06-19

## 2024-07-18 DIAGNOSIS — F41.1 GENERALIZED ANXIETY DISORDER: ICD-10-CM

## 2024-07-18 RX ORDER — ALPRAZOLAM 0.5 MG/1
0.5 TABLET ORAL 3 TIMES DAILY PRN
Qty: 90 TABLET | Refills: 0 | Status: SHIPPED | OUTPATIENT
Start: 2024-07-18

## 2024-08-08 ENCOUNTER — OFFICE VISIT (OUTPATIENT)
Dept: PSYCHIATRY | Facility: CLINIC | Age: 54
End: 2024-08-08
Payer: MEDICARE

## 2024-08-08 VITALS
HEIGHT: 70 IN | HEART RATE: 68 BPM | OXYGEN SATURATION: 96 % | WEIGHT: 248.4 LBS | SYSTOLIC BLOOD PRESSURE: 128 MMHG | BODY MASS INDEX: 35.56 KG/M2 | DIASTOLIC BLOOD PRESSURE: 88 MMHG

## 2024-08-08 DIAGNOSIS — F41.1 GENERALIZED ANXIETY DISORDER: Primary | ICD-10-CM

## 2024-08-08 DIAGNOSIS — Z79.899 MEDICATION MANAGEMENT: ICD-10-CM

## 2024-08-08 DIAGNOSIS — F31.32 BIPOLAR AFFECTIVE DISORDER, CURRENTLY DEPRESSED, MODERATE: ICD-10-CM

## 2024-08-08 DIAGNOSIS — G47.00 INSOMNIA, UNSPECIFIED TYPE: ICD-10-CM

## 2024-08-08 DIAGNOSIS — F33.1 MODERATE EPISODE OF RECURRENT MAJOR DEPRESSIVE DISORDER: ICD-10-CM

## 2024-08-08 DIAGNOSIS — G47.09 OTHER INSOMNIA: ICD-10-CM

## 2024-08-08 LAB
EXTERNAL AMPHETAMINE SCREEN URINE: NEGATIVE
EXTERNAL BENZODIAZEPINE SCREEN URINE: NEGATIVE
EXTERNAL BUPRENORPHINE SCREEN URINE: NEGATIVE
EXTERNAL COCAINE SCREEN URINE: NEGATIVE
EXTERNAL MDMA: NEGATIVE
EXTERNAL METHADONE SCREEN URINE: NEGATIVE
EXTERNAL METHAMPHETAMINE SCREEN URINE: POSITIVE
EXTERNAL OPIATES SCREEN URINE: POSITIVE
EXTERNAL OXYCODONE SCREEN URINE: POSITIVE
EXTERNAL THC SCREEN URINE: POSITIVE

## 2024-08-08 RX ORDER — ESCITALOPRAM OXALATE 20 MG/1
20 TABLET ORAL DAILY
Qty: 30 TABLET | Refills: 2 | Status: SHIPPED | OUTPATIENT
Start: 2024-08-08 | End: 2025-08-08

## 2024-08-08 RX ORDER — LAMOTRIGINE 150 MG/1
150 TABLET ORAL DAILY
Qty: 30 TABLET | Refills: 2 | Status: SHIPPED | OUTPATIENT
Start: 2024-08-08

## 2024-08-08 RX ORDER — ALPRAZOLAM 0.5 MG/1
0.5 TABLET ORAL 3 TIMES DAILY PRN
Qty: 90 TABLET | Refills: 0 | Status: SHIPPED | OUTPATIENT
Start: 2024-08-08

## 2024-08-08 RX ORDER — HYDROXYZINE PAMOATE 25 MG/1
CAPSULE ORAL
Qty: 90 CAPSULE | Refills: 2 | Status: SHIPPED | OUTPATIENT
Start: 2024-08-08
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
## 2024-08-13 ENCOUNTER — APPOINTMENT (OUTPATIENT)
Dept: GENERAL RADIOLOGY | Facility: HOSPITAL | Age: 54
End: 2024-08-13
Payer: MEDICARE

## 2024-08-13 ENCOUNTER — HOSPITAL ENCOUNTER (EMERGENCY)
Facility: HOSPITAL | Age: 54
Discharge: HOME | End: 2024-08-13
Attending: STUDENT IN AN ORGANIZED HEALTH CARE EDUCATION/TRAINING PROGRAM
Payer: MEDICARE

## 2024-08-13 VITALS
SYSTOLIC BLOOD PRESSURE: 137 MMHG | TEMPERATURE: 98 F | HEIGHT: 70 IN | OXYGEN SATURATION: 100 % | HEART RATE: 87 BPM | DIASTOLIC BLOOD PRESSURE: 95 MMHG | RESPIRATION RATE: 16 BRPM | BODY MASS INDEX: 35.65 KG/M2 | WEIGHT: 249 LBS

## 2024-08-13 DIAGNOSIS — M25.511 ACUTE PAIN OF RIGHT SHOULDER: Primary | ICD-10-CM

## 2024-08-13 PROCEDURE — 73030 X-RAY EXAM OF SHOULDER: CPT | Performed by: RADIOLOGY

## 2024-08-13 PROCEDURE — 99283 EMERGENCY DEPT VISIT LOW MDM: CPT

## 2024-08-13 PROCEDURE — 73030 X-RAY EXAM OF SHOULDER: CPT

## 2024-08-13 PROCEDURE — 25010000002 DEXAMETHASONE SODIUM PHOSPHATE 10 MG/ML SOLUTION

## 2024-08-13 PROCEDURE — 96372 THER/PROPH/DIAG INJ SC/IM: CPT

## 2024-08-13 PROCEDURE — 25010000002 KETOROLAC TROMETHAMINE PER 15 MG

## 2024-08-13 RX ORDER — KETOROLAC TROMETHAMINE 30 MG/ML
60 INJECTION, SOLUTION INTRAMUSCULAR; INTRAVENOUS ONCE
Status: COMPLETED | OUTPATIENT
Start: 2024-08-13 | End: 2024-08-13

## 2024-08-13 RX ORDER — DEXAMETHASONE SODIUM PHOSPHATE 10 MG/ML
10 INJECTION, SOLUTION INTRAMUSCULAR; INTRAVENOUS ONCE
Status: COMPLETED | OUTPATIENT
Start: 2024-08-13 | End: 2024-08-13

## 2024-08-13 RX ADMIN — KETOROLAC TROMETHAMINE 60 MG: 30 INJECTION, SOLUTION INTRAMUSCULAR; INTRAVENOUS at 13:42

## 2024-08-13 RX ADMIN — DEXAMETHASONE SODIUM PHOSPHATE 10 MG: 10 INJECTION INTRAMUSCULAR; INTRAVENOUS at 13:42

## 2024-08-13 NOTE — ED PROVIDER NOTES
"Subjective   History of Present Illness  Patient is a 53-year-old male with past medical history of coronary artery disease, degenerative disc disease, and prediabetes.  Patient presents with complaints of right shoulder pain.  Patient reports that he was mowing the lawn this weekend when he began having right shoulder pain now when he moves his shoulder he has a \"popping\" sensation that is audible upon patient moving arm.  Patient reports that he feels this in his collarbone region extending over into the top of his shoulder.  Patient denies any known injury.  Patient has full range of motion noted to the shoulder.  Patient denies any numbness and tingling to this area.  Patient is alert and oriented able to answer questions appropriately.  Patient presents private vehicle.        Review of Systems   Constitutional: Negative.  Negative for fever.   HENT: Negative.     Respiratory: Negative.     Cardiovascular: Negative.  Negative for chest pain.   Gastrointestinal: Negative.  Negative for abdominal pain.   Endocrine: Negative.    Genitourinary: Negative.  Negative for dysuria.   Musculoskeletal:         Shoulder pain   Skin: Negative.    Neurological: Negative.    Psychiatric/Behavioral: Negative.     All other systems reviewed and are negative.      No past medical history on file.    No Known Allergies    No past surgical history on file.    No family history on file.    Social History     Socioeconomic History    Marital status: Single   Tobacco Use    Smoking status: Never    Smokeless tobacco: Never   Vaping Use    Vaping status: Never Used           Objective   Physical Exam  Vitals and nursing note reviewed.   Constitutional:       General: He is not in acute distress.     Appearance: He is well-developed. He is not diaphoretic.   HENT:      Head: Normocephalic and atraumatic.      Right Ear: External ear normal.      Left Ear: External ear normal.      Nose: Nose normal.   Eyes:      Conjunctiva/sclera: " "Conjunctivae normal.      Pupils: Pupils are equal, round, and reactive to light.   Neck:      Vascular: No JVD.      Trachea: No tracheal deviation.   Cardiovascular:      Rate and Rhythm: Normal rate and regular rhythm.      Heart sounds: Normal heart sounds. No murmur heard.  Pulmonary:      Effort: Pulmonary effort is normal. No respiratory distress.      Breath sounds: Normal breath sounds. No wheezing.   Abdominal:      General: Bowel sounds are normal.      Palpations: Abdomen is soft.      Tenderness: There is no abdominal tenderness.   Musculoskeletal:         General: No deformity. Normal range of motion.      Cervical back: Normal range of motion and neck supple.      Comments: Patient noted to have full range of motion to right shoulder however there is an audible clicking sound with patient movement to this area.   Skin:     General: Skin is warm and dry.      Coloration: Skin is not pale.      Findings: No erythema or rash.   Neurological:      Mental Status: He is alert and oriented to person, place, and time.      Cranial Nerves: No cranial nerve deficit.   Psychiatric:         Behavior: Behavior normal.         Thought Content: Thought content normal.     XR Shoulder 2+ View Right    Result Date: 8/13/2024    No acute findings in the right shoulder.   This report was finalized on 8/13/2024 1:53 PM by Dr. Turner Baez MD.         Procedures           ED Course                                             Medical Decision Making  Patient is a 53-year-old male with past medical history of coronary artery disease, degenerative disc disease, and prediabetes.  Patient presents with complaints of right shoulder pain.  Patient reports that he was mowing the lawn this weekend when he began having right shoulder pain now when he moves his shoulder he has a \"popping\" sensation that is audible upon patient moving arm.  Patient reports that he feels this in his collarbone region extending over into the top of " his shoulder.  Patient denies any known injury.  Patient has full range of motion noted to the shoulder.  Patient denies any numbness and tingling to this area.  Patient is alert and oriented able to answer questions appropriately.  Patient presents private vehicle.    Problems Addressed:  Acute pain of right shoulder: complicated acute illness or injury    Amount and/or Complexity of Data Reviewed  Radiology: ordered.    Risk  Prescription drug management.        Final diagnoses:   Acute pain of right shoulder       ED Disposition  ED Disposition       ED Disposition   Discharge    Condition   Stable    Comment   --               Migdalia Peres, APRN  [address] [address]  [phone]    Schedule an appointment as soon as possible for a visit   As needed    Kin Michael, DO  [address] [id number] [phone]    Schedule an appointment as soon as possible for a visit in 2 days      [facility] EMERGENCY DEPARTMENT  [address] [facility] [address] [phone]  Go to   If symptoms worsen         Medication List      No changes were made to your prescriptions during this visit.            Tamara Stovall, APRN  08/13/24 [id number]

## 2024-09-11 DIAGNOSIS — F41.1 GENERALIZED ANXIETY DISORDER: ICD-10-CM

## 2024-09-11 RX ORDER — ALPRAZOLAM 0.5 MG
0.5 TABLET ORAL 3 TIMES DAILY PRN
Qty: 90 TABLET | Refills: 0 | Status: SHIPPED | OUTPATIENT
Start: 2024-09-11

## 2024-10-07 DIAGNOSIS — F41.1 GENERALIZED ANXIETY DISORDER: ICD-10-CM

## 2024-10-07 RX ORDER — ALPRAZOLAM 0.5 MG
0.5 TABLET ORAL 3 TIMES DAILY PRN
Qty: 90 TABLET | Refills: 0 | Status: SHIPPED | OUTPATIENT
Start: 2024-10-07

## 2024-11-04 NOTE — PROGRESS NOTES
Angel Weston is a 54 y.o. male who presents today for follow up    Chief Complaint:  Bipolar disorder, depression, anxiety    History of Present Illness:   History of Present Illness  Today is a follow-up visit.     Medication compliance: patient is compliant with medications, denies SE.  Depression rated 4/10, with 10 being the worst.  Anxiety rated 5/10, with 10 being the worst.  Mood rated 4/10, with 10 being the worst.  Sleep is fair, getting about 6 hours a night,  Nightmares: Denies  Appetite is good.  Hallucinations: Patient denies auditory hallucinations and visual hallucinations  Self-harm: Patient denies thoughts of self-harm.  Alcohol use: no  Drug use: no  Marijuana use: no     Chronic health issues, no acute physical or medical issues today.    Details: He states he has 2 puppies and 1 kitten, helps keep him busy, and he enjoys them.        The following portions of the patient's history were reviewed and updated as appropriate: allergies, current medications, past family history, past medical history, past social history, past surgical history and problem list.      Past Medical History:  History reviewed. No pertinent past medical history.    Social History:  Social History     Socioeconomic History    Marital status: Single   Tobacco Use    Smoking status: Never    Smokeless tobacco: Never   Vaping Use    Vaping status: Never Used       Family History:  History reviewed. No pertinent family history.    Past Surgical History:  History reviewed. No pertinent surgical history.    Problem List:  There is no problem list on file for this patient.      Allergy:   No Known Allergies     Current Medications:   Current Outpatient Medications   Medication Sig Dispense Refill    ALPRAZolam (XANAX) 0.5 MG tablet Take 1 tablet by mouth 3 (Three) Times a Day As Needed for Anxiety. 90 tablet 0    amLODIPine (NORVASC) 10 MG tablet       cloNIDine (CATAPRES) 0.1 MG tablet       escitalopram (Lexapro) 20 MG  "tablet Take 1 tablet by mouth Daily. 30 tablet 2    flecainide (TAMBOCOR) 50 MG tablet       gabapentin (NEURONTIN) 800 MG tablet Take 1 tablet by mouth 2 (Two) Times a Day.      gemfibrozil (LOPID) 600 MG tablet       hydroCHLOROthiazide (HYDRODIURIL) 25 MG tablet       HYDROcodone-acetaminophen (NORCO)  MG per tablet Take 1 tablet by mouth 2 (Two) Times a Day.      hydrOXYzine pamoate (VISTARIL) 25 MG capsule Take 1 capsule up to 3 times daily for anxiety. 90 capsule 2    ibuprofen (ADVIL,MOTRIN) 800 MG tablet       lamoTRIgine (LaMICtal) 150 MG tablet Take 1 tablet by mouth Daily. 30 tablet 2    losartan-hydrochlorothiazide (HYZAAR) 50-12.5 MG per tablet       metoprolol tartrate (LOPRESSOR) 25 MG tablet       omeprazole (priLOSEC) 20 MG capsule Take 1 capsule by mouth Daily.      tiZANidine (ZANAFLEX) 4 MG tablet       vitamin D (ERGOCALCIFEROL) 1.25 MG (46570 UT) capsule capsule        No current facility-administered medications for this visit.       Review of Symptoms:    Review of Systems   Musculoskeletal:  Positive for arthralgias, back pain and neck pain.   Neurological:  Positive for memory problem.   Psychiatric/Behavioral:  Positive for sleep disturbance, depressed mood and stress. Negative for agitation, dysphoric mood, hallucinations, self-injury and suicidal ideas. The patient is nervous/anxious.    All other systems reviewed and are negative.      Objective   Physical Exam:   Blood pressure 130/80, pulse 73, height 177.8 cm (70\"), weight 114 kg (251 lb 9.6 oz), SpO2 98%.  Body mass index is 36.1 kg/m².    11/07/24      MENTAL STATUS EXAM   General Appearance:  Cleanly groomed and dressed  Eye Contact:  Good eye contact  Attitude:  Cooperative  Motor Activity:  Normal gait, posture  Speech:  Normal rate, tone, volume  Language:  Spontaneous  Mood and affect:  Normal, pleasant  Hopelessness:  Denies  Thought Process:  Logical  Associations/ Thought Content:  No delusions  Hallucinations:  " None  Suicidal Ideations:  Not present  Homicidal Ideation:  Not present  Sensorium:  Alert  Orientation:  Person, place, time and situation  Insight:  Fair  Judgement:  Fair  Reliability:  Fair  Impulse Control:  Fair      PHQ-9 Total Score:        Lab Results:   Office Visit on 11/07/2024   Component Date Value Ref Range Status    Amphetamine Screen, Urine 11/07/2024 Negative  Negative Final    Preliminary results. Refer to confirmation send out from [facility] for final results.    Barbiturates Screen, Urine 11/07/2024 Negative  Negative Final    Buprenorphine, Screen, Urine 11/07/2024 Negative  Negative Final    Benzodiazepine Screen, Urine 11/07/2024 Negative  Negative Final    Cocaine Screen, Urine 11/07/2024 Negative  Negative Final    MDMA (ECSTASY) 11/07/2024 Negative  Negative Final    Methamphetamine, Ur 11/07/2024 Negative  Negative Final    Methadone Screen, Urine 11/07/2024 Negative  Negative Final    Morphine/Opiates Screen, Urine 11/07/2024 Positive (A)  Negative Final    Oxycodone Screen, Urine 11/07/2024 Negative  Negative Final    Phencyclidine (PCP), Urine 11/07/2024 Negative  Negative Final    Propoxyphene Scree, Urine 11/07/2024 Negative  Negative Final    Tricyclic Antidepressants Screen 11/07/2024 Negative  Negative Final    THC, Screen, Urine 11/07/2024 Positive (A)  Negative Final       Assessment & Plan   Problems Addressed this Visit    None  Visit Diagnoses       Medication management    -  Primary    Relevant Medications    lamoTRIgine (LaMICtal) 150 MG tablet    escitalopram (Lexapro) 20 MG tablet    Other Relevant Orders    CBC & Differential    Comprehensive Metabolic Panel    Lipid Panel    TSH    T4, Free    Vitamin B12    POC Medline 14 Panel Urine Drug Screen (Completed)    Generalized anxiety disorder        Relevant Medications    lamoTRIgine (LaMICtal) 150 MG tablet    hydrOXYzine pamoate (VISTARIL) 25 MG capsule    escitalopram (Lexapro) 20 MG tablet    ALPRAZolam (XANAX) 0.5  MG tablet    Other Relevant Orders    CBC & Differential    Comprehensive Metabolic Panel    Lipid Panel    TSH    T4, Free    Vitamin B12    Moderate episode of recurrent major depressive disorder        Relevant Medications    lamoTRIgine (LaMICtal) 150 MG tablet    hydrOXYzine pamoate (VISTARIL) 25 MG capsule    escitalopram (Lexapro) 20 MG tablet    ALPRAZolam (XANAX) 0.5 MG tablet    Other Relevant Orders    CBC & Differential    Comprehensive Metabolic Panel    Lipid Panel    TSH    T4, Free    Vitamin B12    Other insomnia        Relevant Orders    CBC & Differential    Comprehensive Metabolic Panel    Lipid Panel    TSH    T4, Free    Vitamin B12    Bipolar affective disorder, currently depressed, moderate        Relevant Medications    lamoTRIgine (LaMICtal) 150 MG tablet    hydrOXYzine pamoate (VISTARIL) 25 MG capsule    escitalopram (Lexapro) 20 MG tablet    ALPRAZolam (XANAX) 0.5 MG tablet    Other Relevant Orders    CBC & Differential    Comprehensive Metabolic Panel    Lipid Panel    TSH    T4, Free    Vitamin B12    Insomnia, unspecified type        Relevant Medications    lamoTRIgine (LaMICtal) 150 MG tablet    hydrOXYzine pamoate (VISTARIL) 25 MG capsule    Other Relevant Orders    CBC & Differential    Comprehensive Metabolic Panel    Lipid Panel    TSH    T4, Free    Vitamin B12          Diagnoses         Codes Comments    Medication management    -  Primary ICD-10-CM: Z79.899  ICD-9-CM: V58.69     Generalized anxiety disorder     ICD-10-CM: F41.1  ICD-9-CM: 300.02     Moderate episode of recurrent major depressive disorder     ICD-10-CM: F33.1  ICD-9-CM: 296.32     Other insomnia     ICD-10-CM: G47.09  ICD-9-CM: 780.52     Bipolar affective disorder, currently depressed, moderate     ICD-10-CM: F31.32  ICD-9-CM: 296.52     Insomnia, unspecified type     ICD-10-CM: G47.00  ICD-9-CM: 780.52           Social History     Tobacco Use   Smoking Status Never   Smokeless Tobacco Never     NIKHIL reviewed  and appropriate. Patient counseled on use of controlled substances.       -The benefits of a healthy diet and exercise were discussed with patient, especially the positive effects they have on mental health. Patient encouraged to consider lifestyle modification regarding  diet and exercise patterns to maximize results of mental health treatment.  -Reviewed previous available documentation  -Reviewed most recent available labs   -This APRN has discussed that a very slow dose titration when starting, or changing doses, of lamotrigine may reduce the incidence of skin rash and other side effects.  The dosage should not be titrated upwards or increased faster than recommended due to the possibility of the discussed side effects and risk of development of a skin rash (which can become life threatening).  This APRN has also discussed that if the patient stops taking the lamotrigine for 5 days or longer, it will be necessary to restart the drug with an initial dose titration, as rashes have been reported on reexposure.  If the patient/guardian and Provider decide to stop the lamotrigine, the patient/guardian will follow the directions of this APRN/this office as a guided taper over about two weeks is appropriate due to the risk of relapse in bipolar disorder with those with bipolar disorder, the risk of seizures in those with epilepsy, and discontinuation symptoms upon rapid discontinuation of lamotrigine. The patient/guardian verbalizes understanding of benefits and risks as discussed, the patient/guardian feels the benefits outweigh the risks and is agreeable to continue/take lamotrigine as discussed.  The patient/guardian is advised should any side effects or rash develops they are to stop the lamotrigine immediately and contact this APRN/this office or go to the emergency department immediately.  The patient/guardian verbalizes understanding and agreement with treatment plan in their own words.  He reports he has been  taking Lamictal 25 mg twice daily.   -I've explained to him that drugs of the SSRI class can have side effects such as weight gain, sexual dysfunction, insomnia, headache, nausea. These medications are generally effective at alleviating symptoms of anxiety and/or depression. Let me know if significant side effects do occur.    -Discussed risks of taking benzodiazapine's with narcotics, including respiratory depression and death, he verbalizes understanding. Instructed to take the least amount of pain medication and benzodiazapine's as possible to manage pain and anxiety.    The patient is being prescribed a controlled substance as part of the treatment plan. The patient has been educated of appropriate use of the medication, including risks and side effects such as somnolence, limited ability to drive and/or work or function safely, potential for dependence, respiratory depression, falls, change in blood pressure, changes in heart rhythm or heart rate, activation of other mental illnesses, and overdose among others. Patient is also informed that the medication is to be used by the patient only, and to avoid any combined use of ETOH, or other substances, with this medication unless prescribed and as directed by a Provider.  The patient verbalized understanding and agreement with this in their own words.    The patient is on a long-term benzodiazepine therapy which is needed for stable, consistent, and improved quality of life.  We have discussed potential risks and side effects including early onset dementia, addiction with continued use, sedation, fatigue, depression, dizziness, ataxia, slurred speech, weakness, forgetfulness, confusion, hyperexcitability, nervousness, hallucinations, cheri or hypomania, hypotension, hypersalivation, dry mouth, respiratory depression especially when taken with CNS depressants and in overdose, rare hepatic dysfunction, rare renal dysfunction, blood dyscrasias, increased risk of  falls, and impaired driving.  The patient is advised to not drive when taking a controlled substance.  The patient is also informed that the medication is to be used by the patient only, it is to be taken only as prescribed/directed, and to avoid any combined use of alcohol/ETOH or other substances unless prescribed and as directed by a Provider.  The patient verbalizes understanding and willingness in their own words to accept the potential side effects and risks for a better quality of life, and is currently in compliance and agreement with the plan of care.  A controlled substance agreement is on file, and the patient is in agreement with the terms of the controlled substance agreement.  The patient is advised that NIKHIL reports will be reviewed periodically, as well as random drug screens will be performed periodically, and as needed.        Visit Diagnoses:    ICD-10-CM ICD-9-CM   1. Medication management  Z79.899 V58.69   2. Generalized anxiety disorder  F41.1 300.02   3. Moderate episode of recurrent major depressive disorder  F33.1 296.32   4. Other insomnia  G47.09 780.52   5. Bipolar affective disorder, currently depressed, moderate  F31.32 296.52   6. Insomnia, unspecified type  G47.00 780.52       TREATMENT PLAN/GOALS: Continue supportive psychotherapy efforts and medications as indicated. Treatment and medication options discussed during today's visit. Patient acknowledged and verbally consented to continue with current treatment plan and was educated on the importance of compliance with treatment and follow-up appointments.    MEDICATION ISSUES:  Discussed medication options and treatment plan of prescribed medication as well as the risks, benefits, and side effects including potential falls, possible impaired driving and metabolic adversities among others. Patient is agreeable to call the office with any worsening of symptoms or onset of side effects. Patient is agreeable to call 911 or go to the  nearest ER should he/she begin having SI/HI.     MEDS ORDERED DURING VISIT:  New Medications Ordered This Visit   Medications    lamoTRIgine (LaMICtal) 150 MG tablet     Sig: Take 1 tablet by mouth Daily.     Dispense:  30 tablet     Refill:  2    hydrOXYzine pamoate (VISTARIL) 25 MG capsule     Sig: Take 1 capsule up to 3 times daily for anxiety.     Dispense:  90 capsule     Refill:  2    escitalopram (Lexapro) 20 MG tablet     Sig: Take 1 tablet by mouth Daily.     Dispense:  30 tablet     Refill:  2    ALPRAZolam (XANAX) 0.5 MG tablet     Sig: Take 1 tablet by mouth 3 (Three) Times a Day As Needed for Anxiety.     Dispense:  90 tablet     Refill:  0     Fill when due     -Continue Lamictal 150 mg take 1 tablet daily for bipolar disorder.  -Continue Lexapro 20 mg tablet, take 1 tablet daily for depression  -Continue hydroxyzine 25 mg capsule take 1 capsule up to 3 times a day for anxiety.  -Continue Xanax 0.5 mg tablet, take 1 tablet 3 times a day, only as needed for anxiety, fill when due    -CBC, CMP, TSH, T4, LIPID PANEL, VITAMIN B 12  -Continue Psychotherapy.    Return in about 3 months (around 2/7/2025).       Prognosis: Guarded dependent on medication/follow up and treatment plan compliance.  Functionality: pt showing improvements in important areas of daily functioning.     Short-term goals: Patient will adhere to medication regimen and note continued improvement in symptoms over the next 3 months.   Long-term goals: Patient will be adherent to medication management and psychotherapy with continued improvement in symptoms over the next 6 months    I spent 30 minutes caring for Will on this date of service. This time includes time spent by me in the following activities: preparing for the visit, performing a medically appropriate examination and/or evaluation, counseling and educating the patient/family/caregiver, documenting information in the medical record, and ordering medications                This  document has been electronically signed by ALBERT Cameron   November 7, 2024 15:04 EST    Part of this note may be an electronic transcription/translation of spoken language to printed text using the Dragon Dictation System.

## 2024-11-07 ENCOUNTER — OFFICE VISIT (OUTPATIENT)
Dept: PSYCHIATRY | Facility: CLINIC | Age: 54
End: 2024-11-07
Payer: MEDICARE

## 2024-11-07 VITALS
OXYGEN SATURATION: 98 % | BODY MASS INDEX: 36.02 KG/M2 | HEIGHT: 70 IN | SYSTOLIC BLOOD PRESSURE: 130 MMHG | DIASTOLIC BLOOD PRESSURE: 80 MMHG | HEART RATE: 73 BPM | WEIGHT: 251.6 LBS

## 2024-11-07 DIAGNOSIS — Z79.899 MEDICATION MANAGEMENT: Primary | ICD-10-CM

## 2024-11-07 DIAGNOSIS — F41.1 GENERALIZED ANXIETY DISORDER: ICD-10-CM

## 2024-11-07 DIAGNOSIS — F33.1 MODERATE EPISODE OF RECURRENT MAJOR DEPRESSIVE DISORDER: ICD-10-CM

## 2024-11-07 DIAGNOSIS — G47.09 OTHER INSOMNIA: ICD-10-CM

## 2024-11-07 DIAGNOSIS — G47.00 INSOMNIA, UNSPECIFIED TYPE: ICD-10-CM

## 2024-11-07 DIAGNOSIS — F31.32 BIPOLAR AFFECTIVE DISORDER, CURRENTLY DEPRESSED, MODERATE: ICD-10-CM

## 2024-11-07 LAB
AMPHET+METHAMPHET UR QL: NEGATIVE
AMPHETAMINES UR QL: NEGATIVE
BARBITURATES UR QL SCN: NEGATIVE
BENZODIAZ UR QL SCN: NEGATIVE
BUPRENORPHINE SERPL-MCNC: NEGATIVE NG/ML
CANNABINOIDS SERPL QL: POSITIVE
COCAINE UR QL: NEGATIVE
MDMA UR QL SCN: NEGATIVE
METHADONE UR QL SCN: NEGATIVE
MORPHINE/OPIATES SCREEN, URINE: POSITIVE
OXYCODONE UR QL SCN: NEGATIVE
PCP UR QL SCN: NEGATIVE
PROPOXYPH UR QL SCN: NEGATIVE
TRICYCLICS UR QL SCN: NEGATIVE

## 2024-11-07 RX ORDER — LAMOTRIGINE 150 MG/1
150 TABLET ORAL DAILY
Qty: 30 TABLET | Refills: 2 | Status: SHIPPED | OUTPATIENT
Start: 2024-11-07

## 2024-11-07 RX ORDER — ALPRAZOLAM 0.5 MG
0.5 TABLET ORAL 3 TIMES DAILY PRN
Qty: 90 TABLET | Refills: 0 | Status: SHIPPED | OUTPATIENT
Start: 2024-11-07

## 2024-11-07 RX ORDER — ESCITALOPRAM OXALATE 20 MG/1
20 TABLET ORAL DAILY
Qty: 30 TABLET | Refills: 2 | Status: SHIPPED | OUTPATIENT
Start: 2024-11-07 | End: 2025-11-07

## 2024-11-07 RX ORDER — HYDROXYZINE PAMOATE 25 MG/1
CAPSULE ORAL
Qty: 90 CAPSULE | Refills: 2 | Status: SHIPPED | OUTPATIENT
Start: 2024-11-07

## 2024-12-02 DIAGNOSIS — F41.1 GENERALIZED ANXIETY DISORDER: ICD-10-CM

## 2024-12-04 RX ORDER — ALPRAZOLAM 0.5 MG
0.5 TABLET ORAL 3 TIMES DAILY PRN
Qty: 90 TABLET | Refills: 0 | Status: SHIPPED | OUTPATIENT
Start: 2024-12-04

## 2024-12-30 DIAGNOSIS — F41.1 GENERALIZED ANXIETY DISORDER: ICD-10-CM

## 2025-01-02 RX ORDER — ALPRAZOLAM 0.5 MG
0.5 TABLET ORAL 3 TIMES DAILY PRN
Qty: 90 TABLET | Refills: 0 | Status: SHIPPED | OUTPATIENT
Start: 2025-01-02

## 2025-01-27 NOTE — PROGRESS NOTES
Angel Weston is a 54 y.o. male who presents today for follow up    Chief Complaint:  Bipolar disorder, depression, anxiety    History of Present Illness:   History of Present Illness  Today is a follow-up visit.     Medication compliance: patient is compliant with medications, denies SE.  Depression rated 5/10, with 10 being the worst.  Anxiety rated 5/10, with 10 being the worst.  Mood rated 4/10, with 10 being the worst.  Sleep is good, getting about 5 hours a night,  Nightmares: Occasional  Appetite is good.  Hallucinations: Patient denies auditory hallucinations and visual hallucinations  Self-harm: Patient denies thoughts of self-harm.  Alcohol use: no  Drug use: no  Marijuana use: he smokes occasionally.     Chronic health issues, no acute physical or medical issues today.    Details: He states he is doing well, his puppies are doing well, kitten disappeared.       The following portions of the patient's history were reviewed and updated as appropriate: allergies, current medications, past family history, past medical history, past social history, past surgical history and problem list.      Past Medical History:  History reviewed. No pertinent past medical history.    Social History:  Social History     Socioeconomic History    Marital status: Single   Tobacco Use    Smoking status: Never    Smokeless tobacco: Never   Vaping Use    Vaping status: Never Used       Family History:  History reviewed. No pertinent family history.    Past Surgical History:  History reviewed. No pertinent surgical history.    Problem List:  There is no problem list on file for this patient.      Allergy:   No Known Allergies     Current Medications:   Current Outpatient Medications   Medication Sig Dispense Refill    ALPRAZolam (XANAX) 0.5 MG tablet Take 1 tablet by mouth 3 (Three) Times a Day As Needed for Anxiety. 90 tablet 0    amLODIPine (NORVASC) 10 MG tablet       cloNIDine (CATAPRES) 0.1 MG tablet        "escitalopram (Lexapro) 20 MG tablet Take 1 tablet by mouth Daily. 30 tablet 2    flecainide (TAMBOCOR) 50 MG tablet       gabapentin (NEURONTIN) 800 MG tablet Take 1 tablet by mouth 2 (Two) Times a Day.      gemfibrozil (LOPID) 600 MG tablet       hydroCHLOROthiazide (HYDRODIURIL) 25 MG tablet       HYDROcodone-acetaminophen (NORCO)  MG per tablet Take 1 tablet by mouth 2 (Two) Times a Day.      hydrOXYzine pamoate (VISTARIL) 25 MG capsule Take 1 capsule up to 3 times daily for anxiety. 90 capsule 2    ibuprofen (ADVIL,MOTRIN) 800 MG tablet       lamoTRIgine (LaMICtal) 150 MG tablet Take 1 tablet by mouth Daily. 30 tablet 2    losartan-hydrochlorothiazide (HYZAAR) 50-12.5 MG per tablet       metoprolol succinate XL (TOPROL-XL) 50 MG 24 hr tablet       omeprazole (priLOSEC) 20 MG capsule Take 1 capsule by mouth Daily.      tamsulosin (FLOMAX) 0.4 MG capsule 24 hr capsule       tiZANidine (ZANAFLEX) 4 MG tablet       vitamin D (ERGOCALCIFEROL) 1.25 MG (40239 UT) capsule capsule        No current facility-administered medications for this visit.       Review of Symptoms:    Review of Systems   Musculoskeletal:  Positive for arthralgias, back pain and neck pain.   Neurological:  Positive for memory problem.   Psychiatric/Behavioral:  Positive for sleep disturbance, depressed mood and stress. Negative for agitation, dysphoric mood, hallucinations, self-injury and suicidal ideas. The patient is nervous/anxious.    All other systems reviewed and are negative.      Objective   Physical Exam:   Blood pressure 130/80, pulse 97, height 177.8 cm (70\"), weight 113 kg (249 lb 9.6 oz), SpO2 98%.  Body mass index is 35.81 kg/m².    01/30/25      MENTAL STATUS EXAM   General Appearance:  Cleanly groomed and dressed  Eye Contact:  Good eye contact  Attitude:  Cooperative  Motor Activity:  Normal gait, posture  Speech:  Normal rate, tone, volume  Language:  Spontaneous  Mood and affect:  Normal, pleasant  Hopelessness:  " Denies  Thought Process:  Logical  Associations/ Thought Content:  No delusions  Hallucinations:  None  Suicidal Ideations:  Not present  Homicidal Ideation:  Not present  Sensorium:  Alert  Orientation:  Person, place, time and situation  Insight:  Fair  Judgement:  Fair  Reliability:  Fair  Impulse Control:  Fair      PHQ-2 Total Score: 0         Lab Results:   No visits with results within 1 Month(s) from this visit.   Latest known visit with results is:   Office Visit on 11/07/2024   Component Date Value Ref Range Status    Amphetamine Screen, Urine 11/07/2024 Negative  Negative Final    Preliminary results. Refer to confirmation send out from [facility] for final results.    Barbiturates Screen, Urine 11/07/2024 Negative  Negative Final    Buprenorphine, Screen, Urine 11/07/2024 Negative  Negative Final    Benzodiazepine Screen, Urine 11/07/2024 Negative  Negative Final    Cocaine Screen, Urine 11/07/2024 Negative  Negative Final    MDMA (ECSTASY) 11/07/2024 Negative  Negative Final    Methamphetamine, Ur 11/07/2024 Negative  Negative Final    Methadone Screen, Urine 11/07/2024 Negative  Negative Final    Morphine/Opiates Screen, Urine 11/07/2024 Positive (A)  Negative Final    Oxycodone Screen, Urine 11/07/2024 Negative  Negative Final    Phencyclidine (PCP), Urine 11/07/2024 Negative  Negative Final    Propoxyphene Scree, Urine 11/07/2024 Negative  Negative Final    Tricyclic Antidepressants Screen 11/07/2024 Negative  Negative Final    THC, Screen, Urine 11/07/2024 Positive (A)  Negative Final       Assessment & Plan   Problems Addressed this Visit    None  Visit Diagnoses       Generalized anxiety disorder    -  Primary    Relevant Medications    lamoTRIgine (LaMICtal) 150 MG tablet    hydrOXYzine pamoate (VISTARIL) 25 MG capsule    escitalopram (Lexapro) 20 MG tablet    ALPRAZolam (XANAX) 0.5 MG tablet    Moderate episode of recurrent major depressive disorder        Relevant Medications    lamoTRIgine  (LaMICtal) 150 MG tablet    hydrOXYzine pamoate (VISTARIL) 25 MG capsule    escitalopram (Lexapro) 20 MG tablet    ALPRAZolam (XANAX) 0.5 MG tablet    Other insomnia        Bipolar affective disorder, currently depressed, moderate        Relevant Medications    lamoTRIgine (LaMICtal) 150 MG tablet    hydrOXYzine pamoate (VISTARIL) 25 MG capsule    escitalopram (Lexapro) 20 MG tablet    ALPRAZolam (XANAX) 0.5 MG tablet    Medication management        Relevant Medications    lamoTRIgine (LaMICtal) 150 MG tablet    escitalopram (Lexapro) 20 MG tablet    Insomnia, unspecified type        Relevant Medications    lamoTRIgine (LaMICtal) 150 MG tablet    hydrOXYzine pamoate (VISTARIL) 25 MG capsule          Diagnoses         Codes Comments    Generalized anxiety disorder    -  Primary ICD-10-CM: F41.1  ICD-9-CM: 300.02     Moderate episode of recurrent major depressive disorder     ICD-10-CM: F33.1  ICD-9-CM: 296.32     Other insomnia     ICD-10-CM: G47.09  ICD-9-CM: 780.52     Bipolar affective disorder, currently depressed, moderate     ICD-10-CM: F31.32  ICD-9-CM: 296.52     Medication management     ICD-10-CM: Z79.899  ICD-9-CM: V58.69     Insomnia, unspecified type     ICD-10-CM: G47.00  ICD-9-CM: 780.52           Social History     Tobacco Use   Smoking Status Never   Smokeless Tobacco Never     NIKHIL reviewed and appropriate. Patient counseled on use of controlled substances.       -The benefits of a healthy diet and exercise were discussed with patient, especially the positive effects they have on mental health. Patient encouraged to consider lifestyle modification regarding  diet and exercise patterns to maximize results of mental health treatment.  -Reviewed previous available documentation  -Reviewed most recent available labs   -This APRN has discussed that a very slow dose titration when starting, or changing doses, of lamotrigine may reduce the incidence of skin rash and other side effects.  The dosage should  not be titrated upwards or increased faster than recommended due to the possibility of the discussed side effects and risk of development of a skin rash (which can become life threatening).  This APRN has also discussed that if the patient stops taking the lamotrigine for 5 days or longer, it will be necessary to restart the drug with an initial dose titration, as rashes have been reported on reexposure.  If the patient/guardian and Provider decide to stop the lamotrigine, the patient/guardian will follow the directions of this APRN/this office as a guided taper over about two weeks is appropriate due to the risk of relapse in bipolar disorder with those with bipolar disorder, the risk of seizures in those with epilepsy, and discontinuation symptoms upon rapid discontinuation of lamotrigine. The patient/guardian verbalizes understanding of benefits and risks as discussed, the patient/guardian feels the benefits outweigh the risks and is agreeable to continue/take lamotrigine as discussed.  The patient/guardian is advised should any side effects or rash develops they are to stop the lamotrigine immediately and contact this APRN/this office or go to the emergency department immediately.  The patient/guardian verbalizes understanding and agreement with treatment plan in their own words.  He reports he has been taking Lamictal 25 mg twice daily.   -I've explained to him that drugs of the SSRI class can have side effects such as weight gain, sexual dysfunction, insomnia, headache, nausea. These medications are generally effective at alleviating symptoms of anxiety and/or depression. Let me know if significant side effects do occur.    -Discussed risks of taking benzodiazapine's with narcotics, including respiratory depression and death, he verbalizes understanding. Instructed to take the least amount of pain medication and benzodiazapine's as possible to manage pain and anxiety.    The patient is being prescribed a  controlled substance as part of the treatment plan. The patient has been educated of appropriate use of the medication, including risks and side effects such as somnolence, limited ability to drive and/or work or function safely, potential for dependence, respiratory depression, falls, change in blood pressure, changes in heart rhythm or heart rate, activation of other mental illnesses, and overdose among others. Patient is also informed that the medication is to be used by the patient only, and to avoid any combined use of ETOH, or other substances, with this medication unless prescribed and as directed by a Provider.  The patient verbalized understanding and agreement with this in their own words.    The patient is on a long-term benzodiazepine therapy which is needed for stable, consistent, and improved quality of life.  We have discussed potential risks and side effects including early onset dementia, addiction with continued use, sedation, fatigue, depression, dizziness, ataxia, slurred speech, weakness, forgetfulness, confusion, hyperexcitability, nervousness, hallucinations, cheri or hypomania, hypotension, hypersalivation, dry mouth, respiratory depression especially when taken with CNS depressants and in overdose, rare hepatic dysfunction, rare renal dysfunction, blood dyscrasias, increased risk of falls, and impaired driving.  The patient is advised to not drive when taking a controlled substance.  The patient is also informed that the medication is to be used by the patient only, it is to be taken only as prescribed/directed, and to avoid any combined use of alcohol/ETOH or other substances unless prescribed and as directed by a Provider.  The patient verbalizes understanding and willingness in their own words to accept the potential side effects and risks for a better quality of life, and is currently in compliance and agreement with the plan of care.  A controlled substance agreement is on file, and the  patient is in agreement with the terms of the controlled substance agreement.  The patient is advised that NIKHIL reports will be reviewed periodically, as well as random drug screens will be performed periodically, and as needed.        Visit Diagnoses:    ICD-10-CM ICD-9-CM   1. Generalized anxiety disorder  F41.1 300.02   2. Moderate episode of recurrent major depressive disorder  F33.1 296.32   3. Other insomnia  G47.09 780.52   4. Bipolar affective disorder, currently depressed, moderate  F31.32 296.52   5. Medication management  Z79.899 V58.69   6. Insomnia, unspecified type  G47.00 780.52         TREATMENT PLAN/GOALS: Continue supportive psychotherapy efforts and medications as indicated. Treatment and medication options discussed during today's visit. Patient acknowledged and verbally consented to continue with current treatment plan and was educated on the importance of compliance with treatment and follow-up appointments.    MEDICATION ISSUES:  Discussed medication options and treatment plan of prescribed medication as well as the risks, benefits, and side effects including potential falls, possible impaired driving and metabolic adversities among others. Patient is agreeable to call the office with any worsening of symptoms or onset of side effects. Patient is agreeable to call 911 or go to the nearest ER should he/she begin having SI/HI.     MEDS ORDERED DURING VISIT:  New Medications Ordered This Visit   Medications    lamoTRIgine (LaMICtal) 150 MG tablet     Sig: Take 1 tablet by mouth Daily.     Dispense:  30 tablet     Refill:  2    hydrOXYzine pamoate (VISTARIL) 25 MG capsule     Sig: Take 1 capsule up to 3 times daily for anxiety.     Dispense:  90 capsule     Refill:  2    escitalopram (Lexapro) 20 MG tablet     Sig: Take 1 tablet by mouth Daily.     Dispense:  30 tablet     Refill:  2    ALPRAZolam (XANAX) 0.5 MG tablet     Sig: Take 1 tablet by mouth 3 (Three) Times a Day As Needed for Anxiety.      Dispense:  90 tablet     Refill:  0     Fill when due     -Continue Lamictal 150 mg take 1 tablet daily for bipolar disorder.  -Continue Lexapro 20 mg tablet, take 1 tablet daily for depression  -Continue hydroxyzine 25 mg capsule take 1 capsule up to 3 times a day for anxiety.  -Continue Xanax 0.5 mg tablet, take 1 tablet 3 times a day, only as needed for anxiety, fill when due    -Reminded him to complete CBC, CMP, TSH, T4, LIPID PANEL, VITAMIN B 12  -Continue Psychotherapy.    Return in about 3 months (around 4/30/2025).       Prognosis: Guarded dependent on medication/follow up and treatment plan compliance.  Functionality: pt showing improvements in important areas of daily functioning.     Short-term goals: Patient will adhere to medication regimen and note continued improvement in symptoms over the next 3 months.   Long-term goals: Patient will be adherent to medication management and psychotherapy with continued improvement in symptoms over the next 6 months    I spent 30 minutes caring for Will on this date of service. This time includes time spent by me in the following activities: preparing for the visit, performing a medically appropriate examination and/or evaluation, counseling and educating the patient/family/caregiver, documenting information in the medical record, and ordering medications              This document has been electronically signed by ALBERT Cameron   January 30, 2025 13:05 EST    Part of this note may be an electronic transcription/translation of spoken language to printed text using the Dragon Dictation System.

## 2025-01-30 ENCOUNTER — OFFICE VISIT (OUTPATIENT)
Dept: PSYCHIATRY | Facility: CLINIC | Age: 55
End: 2025-01-30
Payer: MEDICARE

## 2025-01-30 VITALS
HEIGHT: 70 IN | DIASTOLIC BLOOD PRESSURE: 80 MMHG | HEART RATE: 97 BPM | OXYGEN SATURATION: 98 % | SYSTOLIC BLOOD PRESSURE: 130 MMHG | WEIGHT: 249.6 LBS | BODY MASS INDEX: 35.73 KG/M2

## 2025-01-30 DIAGNOSIS — G47.00 INSOMNIA, UNSPECIFIED TYPE: ICD-10-CM

## 2025-01-30 DIAGNOSIS — F41.1 GENERALIZED ANXIETY DISORDER: Primary | ICD-10-CM

## 2025-01-30 DIAGNOSIS — Z79.899 MEDICATION MANAGEMENT: ICD-10-CM

## 2025-01-30 DIAGNOSIS — F33.1 MODERATE EPISODE OF RECURRENT MAJOR DEPRESSIVE DISORDER: ICD-10-CM

## 2025-01-30 DIAGNOSIS — F31.32 BIPOLAR AFFECTIVE DISORDER, CURRENTLY DEPRESSED, MODERATE: ICD-10-CM

## 2025-01-30 DIAGNOSIS — G47.09 OTHER INSOMNIA: ICD-10-CM

## 2025-01-30 RX ORDER — LAMOTRIGINE 150 MG/1
150 TABLET ORAL DAILY
Qty: 30 TABLET | Refills: 2 | Status: SHIPPED | OUTPATIENT
Start: 2025-01-30

## 2025-01-30 RX ORDER — ESCITALOPRAM OXALATE 20 MG/1
20 TABLET ORAL DAILY
Qty: 30 TABLET | Refills: 2 | Status: SHIPPED | OUTPATIENT
Start: 2025-01-30 | End: 2026-01-30

## 2025-01-30 RX ORDER — HYDROXYZINE PAMOATE 25 MG/1
CAPSULE ORAL
Qty: 90 CAPSULE | Refills: 2 | Status: SHIPPED | OUTPATIENT
Start: 2025-01-30

## 2025-01-30 RX ORDER — TAMSULOSIN HYDROCHLORIDE 0.4 MG/1
CAPSULE ORAL
COMMUNITY
Start: 2025-01-03

## 2025-01-30 RX ORDER — METOPROLOL SUCCINATE 50 MG/1
TABLET, EXTENDED RELEASE ORAL
COMMUNITY
Start: 2025-01-22

## 2025-01-30 RX ORDER — ALPRAZOLAM 0.5 MG
0.5 TABLET ORAL 3 TIMES DAILY PRN
Qty: 90 TABLET | Refills: 0 | Status: SHIPPED | OUTPATIENT
Start: 2025-01-30

## 2025-02-25 DIAGNOSIS — F41.1 GENERALIZED ANXIETY DISORDER: ICD-10-CM

## 2025-02-26 RX ORDER — ALPRAZOLAM 0.5 MG
0.5 TABLET ORAL 3 TIMES DAILY PRN
Qty: 90 TABLET | Refills: 0 | Status: SHIPPED | OUTPATIENT
Start: 2025-02-26

## 2025-03-24 DIAGNOSIS — F41.1 GENERALIZED ANXIETY DISORDER: ICD-10-CM

## 2025-03-24 RX ORDER — ALPRAZOLAM 0.5 MG
0.5 TABLET ORAL 3 TIMES DAILY PRN
Qty: 90 TABLET | Refills: 0 | Status: SHIPPED | OUTPATIENT
Start: 2025-03-24

## 2025-04-21 DIAGNOSIS — F31.32 BIPOLAR AFFECTIVE DISORDER, CURRENTLY DEPRESSED, MODERATE: ICD-10-CM

## 2025-04-21 DIAGNOSIS — F33.1 MODERATE EPISODE OF RECURRENT MAJOR DEPRESSIVE DISORDER: ICD-10-CM

## 2025-04-21 DIAGNOSIS — G47.00 INSOMNIA, UNSPECIFIED TYPE: ICD-10-CM

## 2025-04-21 DIAGNOSIS — F41.1 GENERALIZED ANXIETY DISORDER: ICD-10-CM

## 2025-04-21 DIAGNOSIS — Z79.899 MEDICATION MANAGEMENT: ICD-10-CM

## 2025-04-21 RX ORDER — HYDROXYZINE PAMOATE 25 MG/1
CAPSULE ORAL
Qty: 90 CAPSULE | Refills: 2 | Status: SHIPPED | OUTPATIENT
Start: 2025-04-21

## 2025-04-21 RX ORDER — ALPRAZOLAM 0.5 MG
0.5 TABLET ORAL 3 TIMES DAILY PRN
Qty: 90 TABLET | Refills: 0 | Status: SHIPPED | OUTPATIENT
Start: 2025-04-21

## 2025-04-21 RX ORDER — LAMOTRIGINE 150 MG/1
150 TABLET ORAL DAILY
Qty: 30 TABLET | Refills: 2 | Status: SHIPPED | OUTPATIENT
Start: 2025-04-21

## 2025-04-21 RX ORDER — ESCITALOPRAM OXALATE 20 MG/1
20 TABLET ORAL DAILY
Qty: 30 TABLET | Refills: 2 | Status: SHIPPED | OUTPATIENT
Start: 2025-04-21 | End: 2026-04-21

## 2025-04-24 DIAGNOSIS — F41.1 GENERALIZED ANXIETY DISORDER: ICD-10-CM

## 2025-04-24 RX ORDER — ALPRAZOLAM 0.5 MG
0.5 TABLET ORAL 3 TIMES DAILY PRN
Qty: 90 TABLET | Refills: 0 | OUTPATIENT
Start: 2025-04-24

## 2025-05-19 DIAGNOSIS — F41.1 GENERALIZED ANXIETY DISORDER: ICD-10-CM

## 2025-05-19 RX ORDER — ALPRAZOLAM 0.5 MG
0.5 TABLET ORAL 3 TIMES DAILY PRN
Qty: 90 TABLET | Refills: 0 | Status: SHIPPED | OUTPATIENT
Start: 2025-05-19

## 2025-06-13 DIAGNOSIS — Z79.899 MEDICATION MANAGEMENT: ICD-10-CM

## 2025-06-13 DIAGNOSIS — G47.00 INSOMNIA, UNSPECIFIED TYPE: ICD-10-CM

## 2025-06-13 DIAGNOSIS — F31.32 BIPOLAR AFFECTIVE DISORDER, CURRENTLY DEPRESSED, MODERATE: ICD-10-CM

## 2025-06-13 DIAGNOSIS — F33.1 MODERATE EPISODE OF RECURRENT MAJOR DEPRESSIVE DISORDER: ICD-10-CM

## 2025-06-13 DIAGNOSIS — F41.1 GENERALIZED ANXIETY DISORDER: ICD-10-CM

## 2025-06-13 RX ORDER — HYDROXYZINE PAMOATE 25 MG/1
CAPSULE ORAL
Qty: 90 CAPSULE | Refills: 2 | Status: SHIPPED | OUTPATIENT
Start: 2025-06-13

## 2025-06-13 RX ORDER — LAMOTRIGINE 150 MG/1
150 TABLET ORAL DAILY
Qty: 30 TABLET | Refills: 2 | Status: SHIPPED | OUTPATIENT
Start: 2025-06-13

## 2025-06-13 RX ORDER — ESCITALOPRAM OXALATE 20 MG/1
20 TABLET ORAL DAILY
Qty: 30 TABLET | Refills: 2 | Status: SHIPPED | OUTPATIENT
Start: 2025-06-13 | End: 2026-06-13

## 2025-06-13 RX ORDER — ALPRAZOLAM 0.5 MG
0.5 TABLET ORAL 3 TIMES DAILY PRN
Qty: 90 TABLET | Refills: 0 | Status: SHIPPED | OUTPATIENT
Start: 2025-06-13

## 2025-06-13 NOTE — TELEPHONE ENCOUNTER
Patient hasn't been seen since 01/2025. I wasn't sure if you would send refills in. Please advise. Thank you!

## 2025-07-10 DIAGNOSIS — F41.1 GENERALIZED ANXIETY DISORDER: ICD-10-CM

## 2025-07-10 RX ORDER — ALPRAZOLAM 0.5 MG
0.5 TABLET ORAL 3 TIMES DAILY PRN
Qty: 90 TABLET | Refills: 0 | Status: SHIPPED | OUTPATIENT
Start: 2025-07-10

## 2025-08-08 ENCOUNTER — LAB (OUTPATIENT)
Dept: LAB | Facility: HOSPITAL | Age: 55
End: 2025-08-08
Payer: MEDICARE

## 2025-08-08 ENCOUNTER — OFFICE VISIT (OUTPATIENT)
Dept: PSYCHIATRY | Facility: CLINIC | Age: 55
End: 2025-08-08
Payer: MEDICARE

## 2025-08-08 VITALS
HEIGHT: 70 IN | OXYGEN SATURATION: 98 % | HEART RATE: 94 BPM | DIASTOLIC BLOOD PRESSURE: 78 MMHG | SYSTOLIC BLOOD PRESSURE: 122 MMHG | BODY MASS INDEX: 35.16 KG/M2 | WEIGHT: 245.6 LBS

## 2025-08-08 DIAGNOSIS — G47.09 OTHER INSOMNIA: ICD-10-CM

## 2025-08-08 DIAGNOSIS — Z79.899 MEDICATION MANAGEMENT: Primary | ICD-10-CM

## 2025-08-08 DIAGNOSIS — G47.00 INSOMNIA, UNSPECIFIED TYPE: ICD-10-CM

## 2025-08-08 DIAGNOSIS — F31.32 BIPOLAR AFFECTIVE DISORDER, CURRENTLY DEPRESSED, MODERATE: ICD-10-CM

## 2025-08-08 DIAGNOSIS — F33.1 MODERATE EPISODE OF RECURRENT MAJOR DEPRESSIVE DISORDER: ICD-10-CM

## 2025-08-08 DIAGNOSIS — F41.1 GENERALIZED ANXIETY DISORDER: ICD-10-CM

## 2025-08-08 DIAGNOSIS — Z79.899 MEDICATION MANAGEMENT: ICD-10-CM

## 2025-08-08 LAB
ALBUMIN SERPL-MCNC: 4.3 G/DL (ref 3.5–5.2)
ALBUMIN/GLOB SERPL: 1.6 G/DL
ALP SERPL-CCNC: 78 U/L (ref 39–117)
ALT SERPL W P-5'-P-CCNC: 10 U/L (ref 1–41)
AMPHETAMINE INTERNAL CONTROL: ABNORMAL
AMPHETAMINES UR QL: NEGATIVE
ANION GAP SERPL CALCULATED.3IONS-SCNC: 9.3 MMOL/L (ref 5–15)
AST SERPL-CCNC: 17 U/L (ref 1–40)
BARBITURATES UR QL SCN: NEGATIVE
BASOPHILS # BLD AUTO: 0.06 10*3/MM3 (ref 0–0.2)
BASOPHILS NFR BLD AUTO: 1.4 % (ref 0–1.5)
BENZODIAZ UR QL SCN: POSITIVE
BILIRUB SERPL-MCNC: 0.3 MG/DL (ref 0–1.2)
BUN SERPL-MCNC: 10 MG/DL (ref 6–20)
BUN/CREAT SERPL: 9.6 (ref 7–25)
BUPRENORPHINE SERPL-MCNC: NEGATIVE NG/ML
CALCIUM SPEC-SCNC: 10 MG/DL (ref 8.6–10.5)
CANNABINOIDS SERPL QL: POSITIVE
CHLORIDE SERPL-SCNC: 101 MMOL/L (ref 98–107)
CHOLEST SERPL-MCNC: 112 MG/DL (ref 0–200)
CO2 SERPL-SCNC: 30.7 MMOL/L (ref 22–29)
COCAINE UR QL: NEGATIVE
CREAT SERPL-MCNC: 1.04 MG/DL (ref 0.76–1.27)
DEPRECATED RDW RBC AUTO: 38.7 FL (ref 37–54)
EGFRCR SERPLBLD CKD-EPI 2021: 85.3 ML/MIN/1.73
EOSINOPHIL # BLD AUTO: 0.14 10*3/MM3 (ref 0–0.4)
EOSINOPHIL NFR BLD AUTO: 3.2 % (ref 0.3–6.2)
ERYTHROCYTE [DISTWIDTH] IN BLOOD BY AUTOMATED COUNT: 12.8 % (ref 12.3–15.4)
GLOBULIN UR ELPH-MCNC: 2.7 GM/DL
GLUCOSE SERPL-MCNC: 126 MG/DL (ref 65–99)
HBA1C MFR BLD: 6.1 % (ref 4.8–5.6)
HCT VFR BLD AUTO: 42.5 % (ref 37.5–51)
HDLC SERPL-MCNC: 26 MG/DL (ref 40–60)
HGB BLD-MCNC: 14.3 G/DL (ref 13–17.7)
IMM GRANULOCYTES # BLD AUTO: 0.02 10*3/MM3 (ref 0–0.05)
IMM GRANULOCYTES NFR BLD AUTO: 0.5 % (ref 0–0.5)
LDLC SERPL CALC-MCNC: 42 MG/DL (ref 0–100)
LDLC/HDLC SERPL: 1.11 {RATIO}
LYMPHOCYTES # BLD AUTO: 1.96 10*3/MM3 (ref 0.7–3.1)
LYMPHOCYTES NFR BLD AUTO: 44.7 % (ref 19.6–45.3)
MCH RBC QN AUTO: 28.5 PG (ref 26.6–33)
MCHC RBC AUTO-ENTMCNC: 33.6 G/DL (ref 31.5–35.7)
MCV RBC AUTO: 84.8 FL (ref 79–97)
MDMA UR QL SCN: NEGATIVE
METHADONE UR QL SCN: NEGATIVE
MONOCYTES # BLD AUTO: 0.4 10*3/MM3 (ref 0.1–0.9)
MONOCYTES NFR BLD AUTO: 9.1 % (ref 5–12)
MORPHINE/OPIATES SCREEN, URINE: POSITIVE
NEUTROPHILS NFR BLD AUTO: 1.8 10*3/MM3 (ref 1.7–7)
NEUTROPHILS NFR BLD AUTO: 41.1 % (ref 42.7–76)
NRBC BLD AUTO-RTO: 0 /100 WBC (ref 0–0.2)
OXYCODONE UR QL SCN: NEGATIVE
PCP UR QL SCN: NEGATIVE
PLATELET # BLD AUTO: 238 10*3/MM3 (ref 140–450)
PMV BLD AUTO: 10.4 FL (ref 6–12)
POTASSIUM SERPL-SCNC: 3.8 MMOL/L (ref 3.5–5.2)
PROPOXYPH UR QL SCN: NEGATIVE
PROT SERPL-MCNC: 7 G/DL (ref 6–8.5)
RBC # BLD AUTO: 5.01 10*6/MM3 (ref 4.14–5.8)
SODIUM SERPL-SCNC: 141 MMOL/L (ref 136–145)
T4 FREE SERPL-MCNC: 0.75 NG/DL (ref 0.92–1.68)
TRICYCLICS UR QL SCN: NEGATIVE
TRIGL SERPL-MCNC: 286 MG/DL (ref 0–150)
TSH SERPL DL<=0.05 MIU/L-ACNC: 1.74 UIU/ML (ref 0.27–4.2)
VIT B12 BLD-MCNC: 448 PG/ML (ref 211–946)
VLDLC SERPL-MCNC: 44 MG/DL (ref 5–40)
WBC NRBC COR # BLD AUTO: 4.38 10*3/MM3 (ref 3.4–10.8)

## 2025-08-08 PROCEDURE — 82607 VITAMIN B-12: CPT

## 2025-08-08 PROCEDURE — 85025 COMPLETE CBC W/AUTO DIFF WBC: CPT

## 2025-08-08 PROCEDURE — 83036 HEMOGLOBIN GLYCOSYLATED A1C: CPT

## 2025-08-08 PROCEDURE — 84443 ASSAY THYROID STIM HORMONE: CPT

## 2025-08-08 PROCEDURE — 36415 COLL VENOUS BLD VENIPUNCTURE: CPT

## 2025-08-08 PROCEDURE — 84439 ASSAY OF FREE THYROXINE: CPT

## 2025-08-08 PROCEDURE — 80061 LIPID PANEL: CPT

## 2025-08-08 PROCEDURE — 80053 COMPREHEN METABOLIC PANEL: CPT

## 2025-08-08 RX ORDER — ESCITALOPRAM OXALATE 20 MG/1
20 TABLET ORAL DAILY
Qty: 30 TABLET | Refills: 2 | Status: SHIPPED | OUTPATIENT
Start: 2025-08-08 | End: 2026-08-08

## 2025-08-08 RX ORDER — LAMOTRIGINE 150 MG/1
150 TABLET ORAL DAILY
Qty: 30 TABLET | Refills: 2 | Status: SHIPPED | OUTPATIENT
Start: 2025-08-08

## 2025-08-08 RX ORDER — HYDROXYZINE PAMOATE 25 MG/1
CAPSULE ORAL
Qty: 90 CAPSULE | Refills: 2 | Status: SHIPPED | OUTPATIENT
Start: 2025-08-08

## 2025-08-08 RX ORDER — ALPRAZOLAM 0.5 MG
0.5 TABLET ORAL 3 TIMES DAILY PRN
Qty: 90 TABLET | Refills: 0 | Status: SHIPPED | OUTPATIENT
Start: 2025-08-08

## 2025-08-10 LAB
6-ACETYLMORPHINE: NOT DETECTED NG/ML
9-DELTA-THC-COOH: 477 NG/ML
ALCOHOL, ETHYL: NOT DETECTED MG/DL
ALPHA-HYDROXYALPRAZOLAM: 89 NG/ML
ALPRAZ SPEC-MCNC: 130 NG/ML
AMPHETAMINE: NOT DETECTED NG/ML
BENZODIAZ BLD QL: DETECTED NG/ML
BUPRENORPHINE SAL CFM-MCNC: NOT DETECTED NG/ML
COCAINE METABOLITE: NOT DETECTED NG/ML
CREATININE: 114.4 MG/DL
EDDP SERPL QL: NOT DETECTED NG/ML
FENTANYL-EIA: NOT DETECTED NG/ML
METHAMPHETAMINE: NOT DETECTED NG/ML
OPIATES: DETECTED NG/ML
OXIDANTS: NOT DETECTED UG/ML
OXYCODONE: NOT DETECTED NG/ML
PCP: NOT DETECTED NG/ML
PH: 6.1 (ref 4.5–9)
REF LAB TEST METHOD: NORMAL
SPECIFIC GRAVITY, QUAN: 1.02 (ref 1–1.03)
THC: DETECTED NG/ML